# Patient Record
Sex: FEMALE | Race: BLACK OR AFRICAN AMERICAN | NOT HISPANIC OR LATINO | Employment: OTHER | URBAN - METROPOLITAN AREA
[De-identification: names, ages, dates, MRNs, and addresses within clinical notes are randomized per-mention and may not be internally consistent; named-entity substitution may affect disease eponyms.]

---

## 2018-02-11 ENCOUNTER — HOSPITAL ENCOUNTER (EMERGENCY)
Facility: HOSPITAL | Age: 58
Discharge: HOME | End: 2018-02-12
Attending: EMERGENCY MEDICINE
Payer: MEDICARE

## 2018-02-11 DIAGNOSIS — R06.2 WHEEZING: ICD-10-CM

## 2018-02-11 DIAGNOSIS — R68.89 FLU-LIKE SYMPTOMS: Primary | ICD-10-CM

## 2018-02-11 LAB
FLUAV AG SPEC QL IA: NEGATIVE
FLUBV AG SPEC QL IA: NEGATIVE
SPECIMEN SOURCE: NORMAL

## 2018-02-11 PROCEDURE — 87400 INFLUENZA A/B EACH AG IA: CPT | Mod: 59

## 2018-02-11 PROCEDURE — 99284 EMERGENCY DEPT VISIT MOD MDM: CPT

## 2018-02-12 VITALS
HEART RATE: 88 BPM | SYSTOLIC BLOOD PRESSURE: 133 MMHG | BODY MASS INDEX: 34.15 KG/M2 | RESPIRATION RATE: 18 BRPM | TEMPERATURE: 98 F | OXYGEN SATURATION: 97 % | WEIGHT: 200 LBS | HEIGHT: 64 IN | DIASTOLIC BLOOD PRESSURE: 86 MMHG

## 2018-02-12 PROCEDURE — 25000003 PHARM REV CODE 250: Performed by: NURSE PRACTITIONER

## 2018-02-12 RX ORDER — DOXYCYCLINE HYCLATE 100 MG
100 TABLET ORAL
Status: COMPLETED | OUTPATIENT
Start: 2018-02-12 | End: 2018-02-12

## 2018-02-12 RX ORDER — DOXYCYCLINE 100 MG/1
100 CAPSULE ORAL 2 TIMES DAILY
Qty: 14 CAPSULE | Refills: 0 | Status: SHIPPED | OUTPATIENT
Start: 2018-02-12 | End: 2018-02-19

## 2018-02-12 RX ORDER — OSELTAMIVIR PHOSPHATE 75 MG/1
75 CAPSULE ORAL 2 TIMES DAILY
Qty: 10 CAPSULE | Refills: 0 | Status: SHIPPED | OUTPATIENT
Start: 2018-02-12 | End: 2018-02-17

## 2018-02-12 RX ORDER — OSELTAMIVIR PHOSPHATE 75 MG/1
75 CAPSULE ORAL
Status: COMPLETED | OUTPATIENT
Start: 2018-02-12 | End: 2018-02-12

## 2018-02-12 RX ORDER — OSELTAMIVIR PHOSPHATE 75 MG/1
75 CAPSULE ORAL 2 TIMES DAILY
Qty: 10 CAPSULE | Refills: 0 | Status: SHIPPED | OUTPATIENT
Start: 2018-02-12 | End: 2018-02-12 | Stop reason: CLARIF

## 2018-02-12 RX ADMIN — DOXYCYCLINE HYCLATE 100 MG: 100 TABLET, COATED ORAL at 12:02

## 2018-02-12 RX ADMIN — OSELTAMIVIR PHOSPHATE 75 MG: 75 CAPSULE ORAL at 01:02

## 2018-02-12 NOTE — ED PROVIDER NOTES
Encounter Date: 2018       History     Chief Complaint   Patient presents with    Fever     chills, fever, sore throat, dizziness, cough, HA, body aches;      The history is provided by the patient and the spouse. No  was used.   URI   The primary symptoms include fever, fatigue, headaches, ear pain, sore throat, cough, wheezing, myalgias and arthralgias. Primary symptoms do not include abdominal pain, nausea, vomiting or rash. This is a new problem. The problem has not changed since onset.The maximum temperature recorded prior to her arrival was 101 to 101.9 F.   The myalgias are not associated with weakness.   Symptoms associated with the illness include chills, congestion and rhinorrhea. The illness is not associated with sinus pressure. The following treatments were addressed: A decongestant was ineffective (theraflu). Risk factors for severe complications from URI include chronic cardiac disease.     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%    Diastolic dysfunction with heart failure 2015    Echo     HTN (hypertension)     Hyperglycemia     Hyperlipidemia     ICD (implantable cardioverter-defibrillator), dual, in situ     [employer]     LAE (left atrial enlargement) 2015    MR (mitral regurgitation) 2015    mod     Past Surgical History:   Procedure Laterality Date    CARDIAC DEFIBRILLATOR PLACEMENT       SECTION       Family History   Problem Relation Age of Onset    Early death Mother     Hypertension Mother     Hypertension Father     Stroke Father     Depression Sister     Cancer Maternal Aunt     Cancer Paternal Aunt      Social History   Substance Use Topics    Smoking status:  Never Smoker    Smokeless tobacco: Not on file    Alcohol use No     Review of Systems   Constitutional: Positive for chills, fatigue and fever.   HENT: Positive for congestion, ear pain, rhinorrhea and sore throat. Negative for ear discharge, postnasal drip, sinus pressure, sneezing and voice change.    Eyes: Negative for discharge and itching.   Respiratory: Positive for cough and wheezing. Negative for shortness of breath.    Cardiovascular: Negative for chest pain, palpitations and leg swelling.   Gastrointestinal: Negative for abdominal pain, constipation, diarrhea, nausea and vomiting.   Endocrine: Negative for polydipsia, polyphagia and polyuria.   Genitourinary: Negative for dysuria, frequency, hematuria, urgency, vaginal bleeding, vaginal discharge and vaginal pain.   Musculoskeletal: Positive for arthralgias and myalgias.   Skin: Negative for rash and wound.   Neurological: Positive for headaches. Negative for dizziness, seizures, syncope, weakness and numbness.   Hematological: Negative for adenopathy. Does not bruise/bleed easily.   Psychiatric/Behavioral: Negative for self-injury and suicidal ideas. The patient is not nervous/anxious.        Physical Exam     Initial Vitals [02/11/18 2238]   BP Pulse Resp Temp SpO2   (!) 140/88 93 16 98.3 °F (36.8 °C) 99 %      MAP       105.33         Physical Exam    Nursing note and vitals reviewed.  Constitutional: She appears well-developed and well-nourished.   HENT:   Head: Normocephalic and atraumatic.   Right Ear: External ear normal.   Left Ear: External ear normal.   Nose: Nose normal. No epistaxis.   Mouth/Throat: Oropharynx is clear and moist.   Eyes: Conjunctivae and EOM are normal. Pupils are equal, round, and reactive to light. Right eye exhibits no discharge. Left eye exhibits no discharge.   Neck: Normal range of motion.   Cardiovascular: Regular rhythm, S1 normal, S2 normal and normal heart sounds. Exam reveals no gallop.    No murmur  heard.  Pulmonary/Chest: Effort normal and breath sounds normal. No respiratory distress. She has no decreased breath sounds. She has no wheezes. She has no rhonchi. She has no rales.   Abdominal: She exhibits no distension.   Musculoskeletal: Normal range of motion.   Neurological: She is alert and oriented to person, place, and time.   Skin: Skin is dry. Capillary refill takes less than 2 seconds.         ED Course   Procedures  Labs Reviewed   INFLUENZA A AND B ANTIGEN             Medical Decision Making:   This is an evaluation of a 57 y.o. female that presents to the Emergency Department for cough, rhinorrhea and nasal congestion. The patient is a non-toxic, afebrile, and well appearing female. On physical exam ears and pharynx are without evidence of infection. Appears well hydrated with moist mucus membranes. Neck soft and supple with no meningeal signs or cervical lymphadenopathy. Breath sounds are clear and equal bilaterally with no adventitious breath sounds, tachypnea or respiratory distress with room air pulse ox of 97% and no evidence of hypoxia.     Vital Signs Are Reassuring.  RESULTS: neg flu a/b, normal cxr    My overall impression is flu-like symptoms. I considered, but at this time, do not suspect OM, OE, strep pharyngitis, meningitis, pneumonia, or acute bacterial sinusitis. Pt does have altered lung sounds (per her report), decreased oxygenation (93%), cough and fever, so I will cover for pneumonia with doxycyline upon discharge (and start here in ED).  Due to her hx of chf, I will also treat with tamiflu per cdc guidelines. Pt is urged to f/u with her pcp this week for recheck.    All questions or concerns have been addressed.     This case was discussed with Dr. Lund who is in agreement with my assessment and plan.                      ED Course as of Feb 12 0108   Sun Feb 11, 2018 2338 Influenza A Ag, EIA: Negative [VC]   2338 Influenza B Ag, EIA: Negative [VC]   Mon Feb 12, 2018   0048    No acute process. X-Ray Chest PA And Lateral [VC]      ED Course User Index  [VC] Bill Carbajal DNP     Clinical Impression:   The primary encounter diagnosis was Flu-like symptoms. A diagnosis of Wheezing was also pertinent to this visit.    Disposition:   Disposition: Discharged  Condition: Stable                        Bill Carbajal DNP  02/12/18 0113

## 2018-02-12 NOTE — DISCHARGE INSTRUCTIONS
Alternate Tylenol and advil every 3 hours for fever/body aches. Flonase for congestion and runny nose. Cepacol lozenges, warm fluids to drink, and warm salt water gargles for sore throat. Delsym over the counter for cough. Return to the Emergency department for any worsening or failure to improve, otherwise follow up with your primary care provider.

## 2018-02-12 NOTE — ED TRIAGE NOTES
Pt states that she started with body aches, fever, chills, sore throat, cough (productive with thick dark beige), wheezing. Pt ha tried theraflu OTC but has had no relief.

## 2018-07-26 ENCOUNTER — HOSPITAL ENCOUNTER (EMERGENCY)
Facility: HOSPITAL | Age: 58
Discharge: HOME | End: 2018-07-26
Attending: EMERGENCY MEDICINE
Payer: MEDICARE

## 2018-07-26 VITALS
OXYGEN SATURATION: 98 % | BODY MASS INDEX: 36.37 KG/M2 | HEART RATE: 82 BPM | HEIGHT: 64 IN | SYSTOLIC BLOOD PRESSURE: 132 MMHG | WEIGHT: 213 LBS | RESPIRATION RATE: 19 BRPM | DIASTOLIC BLOOD PRESSURE: 78 MMHG | TEMPERATURE: 99 F

## 2018-07-26 DIAGNOSIS — S06.0X0A CONCUSSION WITHOUT LOSS OF CONSCIOUSNESS, INITIAL ENCOUNTER: Primary | ICD-10-CM

## 2018-07-26 LAB — POCT GLUCOSE: 119 MG/DL (ref 70–110)

## 2018-07-26 PROCEDURE — 99284 EMERGENCY DEPT VISIT MOD MDM: CPT | Mod: 25

## 2018-07-26 PROCEDURE — 82962 GLUCOSE BLOOD TEST: CPT

## 2018-07-26 RX ORDER — ATORVASTATIN CALCIUM 20 MG/1
20 TABLET, FILM COATED ORAL DAILY
COMMUNITY

## 2018-07-26 NOTE — ED PROVIDER NOTES
Encounter Date: 2018       History     Chief Complaint   Patient presents with    Fall     Fell backwards and hit head while bowling.  C/o headache, blurry vision, and nausea.  Denies LOC after fall.     57-year-old female with past medical history anxiety, CHF, hypertension, hyperglycemia, hyperlipidemia, MR, left atrial enlargement, presents to ED complaining of occipital headache, nausea without emesis, intermittent blurred vision, all since fall on Monday evening.  Patient was bowling when she slipped on the oil of the kade, onto her buttock and struck the occipital scalp.  No loss of consciousness at the time of injury. Ambulating immediately afterwards.  Denies history of head trauma or surgeries.  Denies neck pain.  Denies abdominal pain.  Denies unilateral weakness. Denies slurred speech. No change in mentation per family member.  Denies scalp wound.  Denies worse headache of her life, however does state that it is a dull ache throughout the day.  No tinnitus.  She does admit to lightheadedness when rising from a seated position.  No syncope or near syncope.  + photophobia. + phonophobia. No SOB or CP.           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%    Diastolic dysfunction with heart failure 2015    Echo     HTN (hypertension)     Hyperglycemia     Hyperlipidemia     ICD (implantable cardioverter-defibrillator), dual, in situ     [employer]     LAE (left atrial enlargement) 2015    MR (mitral regurgitation) 2015    mod     Past Surgical History:   Procedure Laterality Date    CARDIAC DEFIBRILLATOR PLACEMENT       SECTION       Family History   Problem Relation Age of Onset    Early death Mother      Hypertension Mother     Hypertension Father     Stroke Father     Depression Sister     Cancer Maternal Aunt     Cancer Paternal Aunt      Social History   Substance Use Topics    Smoking status: Never Smoker    Smokeless tobacco: Not on file    Alcohol use No     Review of Systems   Constitutional: Negative for fever.   HENT: Negative for dental problem, ear discharge, ear pain, facial swelling and sore throat.    Eyes: Positive for photophobia and visual disturbance. Negative for pain, discharge, redness and itching.   Respiratory: Negative for chest tightness, shortness of breath and wheezing.    Cardiovascular: Negative for chest pain.   Gastrointestinal: Positive for nausea. Negative for abdominal pain and vomiting.   Endocrine: Negative.    Genitourinary: Negative for dysuria.   Musculoskeletal: Negative for back pain, neck pain and neck stiffness.   Skin: Negative for rash.   Neurological: Positive for light-headedness and headaches. Negative for dizziness, seizures, syncope, weakness and numbness.   Hematological: Does not bruise/bleed easily.   Psychiatric/Behavioral: Negative.    All other systems reviewed and are negative.      Physical Exam     Initial Vitals [07/26/18 1258]   BP Pulse Resp Temp SpO2   (!) 146/93 97 18 98.1 °F (36.7 °C) 99 %      MAP       --         Physical Exam    Nursing note and vitals reviewed.  Constitutional: She appears well-developed and well-nourished. She is not diaphoretic. No distress.   HENT:   Head: Normocephalic and atraumatic.       Eyes: Conjunctivae and EOM are normal. Pupils are equal, round, and reactive to light.   Neck: Normal range of motion. Neck supple. No tracheal deviation present.   Cardiovascular: Intact distal pulses.   Pulmonary/Chest: Breath sounds normal. No stridor. No respiratory distress. She has no wheezes.   Abdominal: Soft. Bowel sounds are normal. She exhibits no distension. There is no tenderness.   No pulsatile mass   Musculoskeletal:  Normal range of motion. She exhibits no tenderness.   No midline spinal ttp.   Lymphadenopathy:     She has no cervical adenopathy.   Neurological: She is alert and oriented to person, place, and time.   No focal deficit. Finger to nose intact bilaterally. Heel to toe intact bilaterally.   Skin: Skin is warm and dry. Capillary refill takes less than 2 seconds.   Psychiatric: She has a normal mood and affect. Her behavior is normal. Judgment and thought content normal.         ED Course   Procedures  Labs Reviewed   POCT GLUCOSE - Abnormal; Notable for the following:        Result Value    POCT Glucose 119 (*)     All other components within normal limits   POCT GLUCOSE MONITORING CONTINUOUS          Imaging Results          CT Head Without Contrast (Final result)  Result time 07/26/18 13:46:57    Final result by Cristiana Martinez MD (07/26/18 13:46:57)                 Impression:      No acute abnormality.      Electronically signed by: Cristiana Martinez MD  Date:    07/26/2018  Time:    13:46             Narrative:    EXAMINATION:  CT HEAD WITHOUT CONTRAST    CLINICAL HISTORY:  Fall; Head Injury;    TECHNIQUE:  Low dose axial CT images obtained throughout the head without intravenous contrast. Sagittal and coronal reconstructions were performed.    COMPARISON:  None.    FINDINGS:  Intracranial compartment:    Ventricles and sulci are normal in size for age without evidence of hydrocephalus. No extra-axial blood or fluid collections.    The brain parenchyma appears normal. No parenchymal mass, hemorrhage, edema or major vascular distribution infarct.    Skull/extracranial contents (limited evaluation): No fracture. Mastoid air cells and paranasal sinuses are essentially clear.                                 Medical Decision Making:   Differential Diagnosis:   Cerebral hemorrhage, subdural hematoma, concussion, posttraumatic headache  ED Management:  56yo F with recent parieto-occipital head trauma 3 days ago.  AAO x4. Ambulatory at scene without loss of consciousness.  Speaking in full sentences, answering questions appropriately.  Ambulating about the ED without issue.  No obvious focal deficit.  The given trauma and complaints, I do think this may represent a concussion. Glucose wnl. Visual acuity wnl. Not orthostatic. I've discussed with patient what to expect from concussions, and I've asked her to f/u with her PCP next week for reevaluation. I've given pt instructions to have a low threshold for returning to ED if any worsening or change in symptoms. She does understand and agree.   Other:   I have discussed this case with another health care provider.       <> Summary of the Discussion: Dr. Mitchell              Attending Attestation:     Physician Attestation Statement for NP/PA:   I discussed this assessment and plan of this patient with the NP/PA, but I did not personally examine the patient. The face to face encounter was performed by the NP/PA.    Other NP/PA Attestation Additions:    History of Present Illness: Mechanical fall, negative work up for traumatic injury.                        Clinical Impression:   The encounter diagnosis was Concussion without loss of consciousness, initial encounter.      Disposition:   Disposition: Discharged  Condition: Stable                        Neville Burnham PA-C  07/26/18 0701       Pedro Mitchell MD  07/26/18 1541

## 2018-07-26 NOTE — ED TRIAGE NOTES
"Patient reports falling while bowling on Monday night. Patient reports intermittent nausea and head pain since falling. Patient state, "My vision is a little blurry".  Patient fell on her back denies LOC patient reports a history of back pain so her back is not giving her any problems.  Patient also reports that after falling she was " a little disoriented".  "

## 2018-07-26 NOTE — DISCHARGE INSTRUCTIONS
Follow-up with your primary care physician next week for re-evaluation and further recommendations.  Get some good rest.  Return to the ED if you experience worsening of symptoms, if any other problems occur during

## 2018-09-02 ENCOUNTER — NURSE TRIAGE (OUTPATIENT)
Dept: ADMINISTRATIVE | Facility: CLINIC | Age: 58
End: 2018-09-02

## 2018-09-02 NOTE — TELEPHONE ENCOUNTER
Patient called to report the following:     -abrasion in vaginal area  -my  was not fully erected and tore my skin while having intercourse  -denies sexual assault   -very painful abrasion all day x 4 days     Education completed per [facility] On Call Care Advice including follow up with ER. Patient verbalized understating.     Reason for Disposition   Followed a genital area injury   [1] MODERATE-SEVERE pain AND [2] lasts > 1 hour    Protocols used:  VULVAR SYMPTOMS-A-,  GENITAL INJURY - FEMALE-A-AH

## 2019-11-05 ENCOUNTER — TELEPHONE (OUTPATIENT)
Dept: EMERGENCY MEDICINE | Facility: HOSPITAL | Age: 59
End: 2019-11-05

## 2019-11-05 NOTE — TELEPHONE ENCOUNTER
Follow up call placed to check on patient due to leaving without being seen, no answer, left message to return call.

## 2020-08-06 ENCOUNTER — PES CALL (OUTPATIENT)
Dept: ADMINISTRATIVE | Facility: CLINIC | Age: 60
End: 2020-08-06

## 2020-11-06 ENCOUNTER — PES CALL (OUTPATIENT)
Dept: ADMINISTRATIVE | Facility: CLINIC | Age: 60
End: 2020-11-06

## 2021-01-01 ENCOUNTER — OFFICE VISIT (OUTPATIENT)
Dept: PSYCHIATRY | Facility: CLINIC | Age: 61
End: 2021-01-01
Payer: MEDICARE

## 2021-01-01 VITALS
SYSTOLIC BLOOD PRESSURE: 97 MMHG | DIASTOLIC BLOOD PRESSURE: 59 MMHG | BODY MASS INDEX: 35.42 KG/M2 | HEART RATE: 78 BPM | WEIGHT: 206.38 LBS

## 2021-01-01 VITALS
HEIGHT: 64 IN | SYSTOLIC BLOOD PRESSURE: 115 MMHG | DIASTOLIC BLOOD PRESSURE: 71 MMHG | WEIGHT: 213.19 LBS | BODY MASS INDEX: 36.4 KG/M2 | HEART RATE: 80 BPM

## 2021-01-01 DIAGNOSIS — F33.1 MODERATE EPISODE OF RECURRENT MAJOR DEPRESSIVE DISORDER: ICD-10-CM

## 2021-01-01 DIAGNOSIS — F41.1 GENERALIZED ANXIETY DISORDER: ICD-10-CM

## 2021-01-01 PROCEDURE — 99204 OFFICE O/P NEW MOD 45 MIN: CPT | Mod: GC,S$GLB,, | Performed by: STUDENT IN AN ORGANIZED HEALTH CARE EDUCATION/TRAINING PROGRAM

## 2021-01-01 PROCEDURE — 99999 PR PBB SHADOW E&M-EST. PATIENT-LVL II: CPT | Mod: PBBFAC,GC,, | Performed by: STUDENT IN AN ORGANIZED HEALTH CARE EDUCATION/TRAINING PROGRAM

## 2021-01-01 PROCEDURE — 99214 PR OFFICE/OUTPT VISIT, EST, LEVL IV, 30-39 MIN: ICD-10-PCS | Mod: GC,S$GLB,, | Performed by: STUDENT IN AN ORGANIZED HEALTH CARE EDUCATION/TRAINING PROGRAM

## 2021-01-01 PROCEDURE — 99214 OFFICE O/P EST MOD 30 MIN: CPT | Mod: GC,S$GLB,, | Performed by: STUDENT IN AN ORGANIZED HEALTH CARE EDUCATION/TRAINING PROGRAM

## 2021-01-01 PROCEDURE — 99999 PR PBB SHADOW E&M-EST. PATIENT-LVL II: ICD-10-PCS | Mod: PBBFAC,GC,, | Performed by: STUDENT IN AN ORGANIZED HEALTH CARE EDUCATION/TRAINING PROGRAM

## 2021-01-01 PROCEDURE — 99999 PR PBB SHADOW E&M-EST. PATIENT-LVL III: ICD-10-PCS | Mod: PBBFAC,GC,, | Performed by: STUDENT IN AN ORGANIZED HEALTH CARE EDUCATION/TRAINING PROGRAM

## 2021-01-01 PROCEDURE — 99204 PR OFFICE/OUTPT VISIT, NEW, LEVL IV, 45-59 MIN: ICD-10-PCS | Mod: GC,S$GLB,, | Performed by: STUDENT IN AN ORGANIZED HEALTH CARE EDUCATION/TRAINING PROGRAM

## 2021-01-01 PROCEDURE — 99999 PR PBB SHADOW E&M-EST. PATIENT-LVL III: CPT | Mod: PBBFAC,GC,, | Performed by: STUDENT IN AN ORGANIZED HEALTH CARE EDUCATION/TRAINING PROGRAM

## 2021-01-01 RX ORDER — AMITRIPTYLINE HYDROCHLORIDE 100 MG/1
100 TABLET ORAL NIGHTLY
Qty: 30 TABLET | Refills: 11 | Status: SHIPPED | OUTPATIENT
Start: 2021-01-01 | End: 2021-01-01

## 2021-01-01 RX ORDER — ALPRAZOLAM 1 MG/1
1 TABLET ORAL DAILY PRN
Qty: 30 TABLET | Refills: 1 | Status: SHIPPED | OUTPATIENT
Start: 2021-01-01 | End: 2021-01-01 | Stop reason: SDUPTHER

## 2021-01-01 RX ORDER — ALPRAZOLAM 1 MG/1
1 TABLET ORAL DAILY PRN
Qty: 30 TABLET | Refills: 1 | Status: SHIPPED | OUTPATIENT
Start: 2021-01-01 | End: 2022-01-01 | Stop reason: SDUPTHER

## 2021-01-01 RX ORDER — AMITRIPTYLINE HYDROCHLORIDE 100 MG/1
200 TABLET ORAL NIGHTLY
Qty: 30 TABLET | Refills: 2 | Status: SHIPPED | OUTPATIENT
Start: 2021-01-01 | End: 2022-01-01

## 2021-01-01 RX ORDER — AMITRIPTYLINE HYDROCHLORIDE 150 MG/1
150 TABLET ORAL NIGHTLY
Qty: 30 TABLET | Refills: 2 | Status: SHIPPED | OUTPATIENT
Start: 2021-01-01 | End: 2021-01-01

## 2021-01-29 ENCOUNTER — HOSPITAL ENCOUNTER (EMERGENCY)
Facility: HOSPITAL | Age: 61
Discharge: HOME | End: 2021-01-29
Attending: EMERGENCY MEDICINE
Payer: MEDICARE

## 2021-01-29 VITALS
TEMPERATURE: 98 F | DIASTOLIC BLOOD PRESSURE: 95 MMHG | HEART RATE: 86 BPM | HEIGHT: 64 IN | RESPIRATION RATE: 18 BRPM | OXYGEN SATURATION: 97 % | WEIGHT: 214 LBS | BODY MASS INDEX: 36.54 KG/M2 | SYSTOLIC BLOOD PRESSURE: 137 MMHG

## 2021-01-29 DIAGNOSIS — S09.90XA CLOSED HEAD INJURY, INITIAL ENCOUNTER: Primary | ICD-10-CM

## 2021-01-29 DIAGNOSIS — S00.93XA CONTUSION OF HEAD, UNSPECIFIED PART OF HEAD, INITIAL ENCOUNTER: ICD-10-CM

## 2021-01-29 DIAGNOSIS — S09.90XA INJURY OF HEAD, INITIAL ENCOUNTER: ICD-10-CM

## 2021-01-29 PROCEDURE — 25000003 PHARM REV CODE 250: Performed by: PHYSICIAN ASSISTANT

## 2021-01-29 PROCEDURE — 99284 EMERGENCY DEPT VISIT MOD MDM: CPT | Mod: 25

## 2021-01-29 RX ORDER — BUTALBITAL, ACETAMINOPHEN AND CAFFEINE 50; 325; 40 MG/1; MG/1; MG/1
1 TABLET ORAL EVERY 6 HOURS PRN
Qty: 11 TABLET | Refills: 0 | Status: SHIPPED | OUTPATIENT
Start: 2021-01-29 | End: 2021-02-28

## 2021-01-29 RX ORDER — BUTALBITAL, ACETAMINOPHEN AND CAFFEINE 50; 325; 40 MG/1; MG/1; MG/1
1 TABLET ORAL
Status: COMPLETED | OUTPATIENT
Start: 2021-01-29 | End: 2021-01-29

## 2021-01-29 RX ORDER — ACETAMINOPHEN 325 MG/1
650 TABLET ORAL
Status: COMPLETED | OUTPATIENT
Start: 2021-01-29 | End: 2021-01-29

## 2021-01-29 RX ORDER — ONDANSETRON 4 MG/1
4 TABLET, ORALLY DISINTEGRATING ORAL
Status: COMPLETED | OUTPATIENT
Start: 2021-01-29 | End: 2021-01-29

## 2021-01-29 RX ORDER — ONDANSETRON 4 MG/1
4 TABLET, FILM COATED ORAL EVERY 6 HOURS PRN
Qty: 12 TABLET | Refills: 0 | Status: SHIPPED | OUTPATIENT
Start: 2021-01-29

## 2021-01-29 RX ORDER — KETOROLAC TROMETHAMINE 10 MG/1
10 TABLET, FILM COATED ORAL
Status: DISCONTINUED | OUTPATIENT
Start: 2021-01-29 | End: 2021-01-29

## 2021-01-29 RX ADMIN — ACETAMINOPHEN 650 MG: 325 TABLET ORAL at 10:01

## 2021-01-29 RX ADMIN — BUTALBITAL, ACETAMINOPHEN AND CAFFEINE 1 TABLET: 50; 325; 40 TABLET ORAL at 11:01

## 2021-01-29 RX ADMIN — ONDANSETRON 4 MG: 4 TABLET, ORALLY DISINTEGRATING ORAL at 11:01

## 2021-04-16 ENCOUNTER — PATIENT MESSAGE (OUTPATIENT)
Dept: RESEARCH | Facility: HOSPITAL | Age: 61
End: 2021-04-16

## 2021-09-17 PROBLEM — F32.9 MAJOR DEPRESSIVE DISORDER: Status: ACTIVE | Noted: 2021-01-01

## 2021-09-17 PROBLEM — F41.1 GENERALIZED ANXIETY DISORDER: Status: ACTIVE | Noted: 2021-01-01

## 2021-09-17 PROBLEM — F41.9 ANXIETY AND DEPRESSION: Status: ACTIVE | Noted: 2021-01-01

## 2021-09-17 PROBLEM — F32.A ANXIETY AND DEPRESSION: Status: ACTIVE | Noted: 2021-01-01

## 2022-01-01 ENCOUNTER — HOSPITAL ENCOUNTER (INPATIENT)
Facility: HOSPITAL | Age: 62
LOS: 1 days | DRG: 308 | End: 2022-09-02
Attending: EMERGENCY MEDICINE | Admitting: HOSPITALIST
Payer: MEDICARE

## 2022-01-01 ENCOUNTER — TELEPHONE (OUTPATIENT)
Dept: PSYCHIATRY | Facility: CLINIC | Age: 62
End: 2022-01-01
Payer: MEDICARE

## 2022-01-01 ENCOUNTER — OFFICE VISIT (OUTPATIENT)
Dept: PSYCHIATRY | Facility: CLINIC | Age: 62
End: 2022-01-01
Payer: MEDICARE

## 2022-01-01 VITALS
SYSTOLIC BLOOD PRESSURE: 102 MMHG | HEART RATE: 85 BPM | BODY MASS INDEX: 33.34 KG/M2 | WEIGHT: 194.25 LBS | DIASTOLIC BLOOD PRESSURE: 65 MMHG

## 2022-01-01 VITALS
WEIGHT: 204.69 LBS | BODY MASS INDEX: 35.14 KG/M2 | HEART RATE: 97 BPM | DIASTOLIC BLOOD PRESSURE: 76 MMHG | SYSTOLIC BLOOD PRESSURE: 120 MMHG

## 2022-01-01 VITALS
SYSTOLIC BLOOD PRESSURE: 141 MMHG | HEART RATE: 50 BPM | HEIGHT: 64 IN | OXYGEN SATURATION: 74 % | WEIGHT: 195 LBS | BODY MASS INDEX: 33.29 KG/M2 | DIASTOLIC BLOOD PRESSURE: 105 MMHG | RESPIRATION RATE: 38 BRPM | TEMPERATURE: 95 F

## 2022-01-01 VITALS
HEART RATE: 102 BPM | BODY MASS INDEX: 33.74 KG/M2 | WEIGHT: 196.56 LBS | SYSTOLIC BLOOD PRESSURE: 114 MMHG | DIASTOLIC BLOOD PRESSURE: 64 MMHG

## 2022-01-01 DIAGNOSIS — Z45.2 PICC (PERIPHERALLY INSERTED CENTRAL CATHETER) IN PLACE: ICD-10-CM

## 2022-01-01 DIAGNOSIS — F41.0 PANIC DISORDER: ICD-10-CM

## 2022-01-01 DIAGNOSIS — Z51.5 PALLIATIVE CARE ENCOUNTER: Primary | ICD-10-CM

## 2022-01-01 DIAGNOSIS — F33.1 MODERATE EPISODE OF RECURRENT MAJOR DEPRESSIVE DISORDER: ICD-10-CM

## 2022-01-01 DIAGNOSIS — Z97.8 ENDOTRACHEAL TUBE PRESENT: ICD-10-CM

## 2022-01-01 DIAGNOSIS — Z46.59 ENCOUNTER FOR OROGASTRIC (OG) TUBE PLACEMENT: ICD-10-CM

## 2022-01-01 DIAGNOSIS — Z71.89 GOALS OF CARE, COUNSELING/DISCUSSION: ICD-10-CM

## 2022-01-01 DIAGNOSIS — I46.9 CARDIAC ARREST: ICD-10-CM

## 2022-01-01 DIAGNOSIS — F33.1 MODERATE EPISODE OF RECURRENT MAJOR DEPRESSIVE DISORDER: Primary | ICD-10-CM

## 2022-01-01 DIAGNOSIS — F41.1 GENERALIZED ANXIETY DISORDER: ICD-10-CM

## 2022-01-01 DIAGNOSIS — I50.43 ACUTE ON CHRONIC COMBINED SYSTOLIC AND DIASTOLIC CONGESTIVE HEART FAILURE: ICD-10-CM

## 2022-01-01 LAB
ALBUMIN SERPL BCP-MCNC: 2.9 G/DL (ref 3.5–5.2)
ALLENS TEST: ABNORMAL
ALP SERPL-CCNC: 128 U/L (ref 55–135)
ALT SERPL W/O P-5'-P-CCNC: 510 U/L (ref 10–44)
AMPHET+METHAMPHET UR QL: NEGATIVE
ANION GAP SERPL CALC-SCNC: 13 MMOL/L (ref 8–16)
AST SERPL-CCNC: 408 U/L (ref 10–40)
AV INDEX (PROSTH): 0.57
AV MEAN GRADIENT: 3 MMHG
AV PEAK GRADIENT: 6 MMHG
AV VALVE AREA: 1.42 CM2
AV VELOCITY RATIO: 0.58
BACTERIA #/AREA URNS HPF: NORMAL /HPF
BARBITURATES UR QL SCN>200 NG/ML: NEGATIVE
BASOPHILS # BLD AUTO: ABNORMAL K/UL (ref 0–0.2)
BASOPHILS NFR BLD: 0 % (ref 0–1.9)
BENZODIAZ UR QL SCN>200 NG/ML: ABNORMAL
BILIRUB SERPL-MCNC: 0.3 MG/DL (ref 0.1–1)
BILIRUB UR QL STRIP: NEGATIVE
BNP SERPL-MCNC: 50 PG/ML (ref 0–99)
BSA FOR ECHO PROCEDURE: 1.99 M2
BUN SERPL-MCNC: 15 MG/DL (ref 8–23)
BZE UR QL SCN: NEGATIVE
CALCIUM SERPL-MCNC: 8.1 MG/DL (ref 8.7–10.5)
CANNABINOIDS UR QL SCN: NEGATIVE
CHLORIDE SERPL-SCNC: 107 MMOL/L (ref 95–110)
CLARITY UR: CLEAR
CO2 SERPL-SCNC: 19 MMOL/L (ref 23–29)
COLOR UR: YELLOW
CREAT SERPL-MCNC: 1 MG/DL (ref 0.5–1.4)
CREAT UR-MCNC: 59.1 MG/DL (ref 15–325)
CTP QC/QA: YES
CV ECHO LV RWT: 0.34 CM
DELSYS: ABNORMAL
DIFFERENTIAL METHOD: ABNORMAL
DOP CALC AO PEAK VEL: 1.2 M/S
DOP CALC AO VTI: 19.36 CM
DOP CALC LVOT AREA: 2.5 CM2
DOP CALC LVOT DIAMETER: 1.78 CM
DOP CALC LVOT PEAK VEL: 0.69 M/S
DOP CALC LVOT STROKE VOLUME: 27.53 CM3
DOP CALCLVOT PEAK VEL VTI: 11.07 CM
ECHO LV POSTERIOR WALL: 1.01 CM (ref 0.6–1.1)
EJECTION FRACTION: 25 %
EOSINOPHIL # BLD AUTO: ABNORMAL K/UL (ref 0–0.5)
EOSINOPHIL NFR BLD: 0 % (ref 0–8)
ERYTHROCYTE [DISTWIDTH] IN BLOOD BY AUTOMATED COUNT: 14.1 % (ref 11.5–14.5)
ERYTHROCYTE [SEDIMENTATION RATE] IN BLOOD BY WESTERGREN METHOD: 22 MM/H
EST. GFR  (NO RACE VARIABLE): >60 ML/MIN/1.73 M^2
FIO2: 100
FRACTIONAL SHORTENING: 5 % (ref 28–44)
GLUCOSE SERPL-MCNC: 243 MG/DL (ref 70–110)
GLUCOSE SERPL-MCNC: 780 MG/DL (ref 70–110)
GLUCOSE UR QL STRIP: ABNORMAL
HCO3 UR-SCNC: 17.3 MMOL/L (ref 24–28)
HCO3 UR-SCNC: 18.3 MMOL/L (ref 24–28)
HCO3 UR-SCNC: 19.8 MMOL/L (ref 24–28)
HCO3 UR-SCNC: 23.8 MMOL/L (ref 24–28)
HCO3 UR-SCNC: 6.4 MMOL/L (ref 24–28)
HCT VFR BLD AUTO: 38 % (ref 37–48.5)
HGB BLD-MCNC: 12.3 G/DL (ref 12–16)
HGB UR QL STRIP: NEGATIVE
HYALINE CASTS #/AREA URNS LPF: 1 /LPF
IMM GRANULOCYTES # BLD AUTO: ABNORMAL K/UL (ref 0–0.04)
IMM GRANULOCYTES NFR BLD AUTO: ABNORMAL % (ref 0–0.5)
INR PPP: 1.7 (ref 0.8–1.2)
INTERVENTRICULAR SEPTUM: 1.21 CM (ref 0.6–1.1)
KETONES UR QL STRIP: NEGATIVE
LA MAJOR: 5.35 CM
LA MINOR: 5.85 CM
LA WIDTH: 5.21 CM
LACTATE SERPL-SCNC: 2.7 MMOL/L (ref 0.5–2.2)
LACTATE SERPL-SCNC: 3.3 MMOL/L (ref 0.5–2.2)
LDH SERPL L TO P-CCNC: 6.52 MMOL/L (ref 0.5–2.2)
LEFT ATRIUM SIZE: 4.96 CM
LEFT ATRIUM VOLUME INDEX: 63.3 ML/M2
LEFT ATRIUM VOLUME: 122.76 CM3
LEFT INTERNAL DIMENSION IN SYSTOLE: 5.73 CM (ref 2.1–4)
LEFT VENTRICLE DIASTOLIC VOLUME INDEX: 93.07 ML/M2
LEFT VENTRICLE DIASTOLIC VOLUME: 180.56 ML
LEFT VENTRICLE MASS INDEX: 146 G/M2
LEFT VENTRICLE SYSTOLIC VOLUME INDEX: 83.5 ML/M2
LEFT VENTRICLE SYSTOLIC VOLUME: 161.96 ML
LEFT VENTRICULAR INTERNAL DIMENSION IN DIASTOLE: 6.01 CM (ref 3.5–6)
LEFT VENTRICULAR MASS: 283.78 G
LEUKOCYTE ESTERASE UR QL STRIP: NEGATIVE
LYMPHOCYTES # BLD AUTO: ABNORMAL K/UL (ref 1–4.8)
LYMPHOCYTES NFR BLD: 43 % (ref 18–48)
MAGNESIUM SERPL-MCNC: 2 MG/DL (ref 1.6–2.6)
MCH RBC QN AUTO: 30.5 PG (ref 27–31)
MCHC RBC AUTO-ENTMCNC: 32.4 G/DL (ref 32–36)
MCV RBC AUTO: 94 FL (ref 82–98)
METAMYELOCYTES NFR BLD MANUAL: 4 %
METHADONE UR QL SCN>300 NG/ML: NEGATIVE
MICROSCOPIC COMMENT: NORMAL
MIN VOL: 6.7
MIN VOL: 6.7
MIN VOL: 9.6
MIN VOL: 9.6
MODE: ABNORMAL
MONOCYTES # BLD AUTO: ABNORMAL K/UL (ref 0.3–1)
MONOCYTES NFR BLD: 1 % (ref 4–15)
MYELOCYTES NFR BLD MANUAL: 1 %
NEUTROPHILS NFR BLD: 43 % (ref 38–73)
NEUTS BAND NFR BLD MANUAL: 8 %
NITRITE UR QL STRIP: NEGATIVE
NRBC BLD-RTO: 0 /100 WBC
OPIATES UR QL SCN: ABNORMAL
PCO2 BLDA: 14.7 MMHG (ref 35–45)
PCO2 BLDA: 33.1 MMHG (ref 35–45)
PCO2 BLDA: 35.5 MMHG (ref 35–45)
PCO2 BLDA: 37.1 MMHG (ref 35–45)
PCO2 BLDA: 62.9 MMHG (ref 35–45)
PCP UR QL SCN>25 NG/ML: NEGATIVE
PEEP: 10
PEEP: 10
PEEP: 14
PEEP: 5
PH SMN: 7.19 [PH] (ref 7.35–7.45)
PH SMN: 7.25 [PH] (ref 7.35–7.45)
PH SMN: 7.32 [PH] (ref 7.35–7.45)
PH SMN: 7.33 [PH] (ref 7.35–7.45)
PH SMN: 7.33 [PH] (ref 7.35–7.45)
PH UR STRIP: 5 [PH] (ref 5–8)
PHOSPHATE SERPL-MCNC: 5.9 MG/DL (ref 2.7–4.5)
PIP: 21
PIP: 25
PISA MRMAX VEL: 0.05 M/S
PISA TR MAX VEL: 2.43 M/S
PLATELET # BLD AUTO: ABNORMAL K/UL (ref 150–450)
PLATELET BLD QL SMEAR: ABNORMAL
PMV BLD AUTO: ABNORMAL FL (ref 9.2–12.9)
PO2 BLDA: 33 MMHG (ref 40–60)
PO2 BLDA: 34 MMHG (ref 40–60)
PO2 BLDA: 37 MMHG (ref 80–100)
PO2 BLDA: 42 MMHG (ref 80–100)
PO2 BLDA: 57 MMHG (ref 80–100)
POC BE: -19 MMOL/L
POC BE: -5 MMOL/L
POC BE: -6 MMOL/L
POC BE: -7 MMOL/L
POC BE: -8 MMOL/L
POC SATURATED O2: 50 % (ref 95–100)
POC SATURATED O2: 56 % (ref 95–100)
POC SATURATED O2: 68 % (ref 95–100)
POC SATURATED O2: 74 % (ref 95–100)
POC SATURATED O2: 88 % (ref 95–100)
POC TCO2: 18 MMOL/L (ref 23–27)
POC TCO2: 19 MMOL/L (ref 23–27)
POC TCO2: 21 MMOL/L (ref 23–27)
POC TCO2: 26 MMOL/L (ref 24–29)
POC TCO2: 7 MMOL/L (ref 24–29)
POCT GLUCOSE: 496 MG/DL (ref 70–110)
POCT GLUCOSE: >500 MG/DL (ref 70–110)
POTASSIUM SERPL-SCNC: 4.2 MMOL/L (ref 3.5–5.1)
PROCALCITONIN SERPL IA-MCNC: 0.05 NG/ML
PROT SERPL-MCNC: 5.9 G/DL (ref 6–8.4)
PROT UR QL STRIP: NEGATIVE
PROTHROMBIN TIME: 17.6 SEC (ref 9–12.5)
RA MAJOR: 3.86 CM
RA WIDTH: 2.89 CM
RBC # BLD AUTO: 4.03 M/UL (ref 4–5.4)
SAMPLE: ABNORMAL
SARS-COV-2 RDRP RESP QL NAA+PROBE: NEGATIVE
SITE: ABNORMAL
SODIUM SERPL-SCNC: 139 MMOL/L (ref 136–145)
SP GR UR STRIP: 1.02 (ref 1–1.03)
SP02: 97
SP02: 97
SQUAMOUS #/AREA URNS HPF: 3 /HPF
STJ: 2.19 CM
TOXICOLOGY INFORMATION: ABNORMAL
TR MAX PG: 24 MMHG
TROPONIN I SERPL DL<=0.01 NG/ML-MCNC: 0.13 NG/ML (ref 0–0.03)
TROPONIN I SERPL DL<=0.01 NG/ML-MCNC: 3.49 NG/ML (ref 0–0.03)
URN SPEC COLLECT METH UR: ABNORMAL
UROBILINOGEN UR STRIP-ACNC: NEGATIVE EU/DL
VT: 380
VT: 40
VT: 450
VT: 450
WBC # BLD AUTO: 13.15 K/UL (ref 3.9–12.7)
YEAST URNS QL MICRO: NORMAL

## 2022-01-01 PROCEDURE — 25000003 PHARM REV CODE 250: Performed by: EMERGENCY MEDICINE

## 2022-01-01 PROCEDURE — 84100 ASSAY OF PHOSPHORUS: CPT | Performed by: EMERGENCY MEDICINE

## 2022-01-01 PROCEDURE — 96374 THER/PROPH/DIAG INJ IV PUSH: CPT | Mod: 59

## 2022-01-01 PROCEDURE — 99900035 HC TECH TIME PER 15 MIN (STAT)

## 2022-01-01 PROCEDURE — 99291 CRITICAL CARE FIRST HOUR: CPT | Mod: 25,,, | Performed by: INTERNAL MEDICINE

## 2022-01-01 PROCEDURE — 93010 EKG 12-LEAD: ICD-10-PCS | Mod: ,,, | Performed by: INTERNAL MEDICINE

## 2022-01-01 PROCEDURE — 63600175 PHARM REV CODE 636 W HCPCS: Performed by: STUDENT IN AN ORGANIZED HEALTH CARE EDUCATION/TRAINING PROGRAM

## 2022-01-01 PROCEDURE — 99223 1ST HOSP IP/OBS HIGH 75: CPT | Mod: ,,, | Performed by: NURSE PRACTITIONER

## 2022-01-01 PROCEDURE — 99214 OFFICE O/P EST MOD 30 MIN: CPT | Mod: S$GLB,,, | Performed by: NURSE PRACTITIONER

## 2022-01-01 PROCEDURE — 36620 INSERTION CATHETER ARTERY: CPT | Mod: ,,, | Performed by: INTERNAL MEDICINE

## 2022-01-01 PROCEDURE — 99223 PR INITIAL HOSPITAL CARE,LEVL III: ICD-10-PCS | Mod: ,,, | Performed by: NURSE PRACTITIONER

## 2022-01-01 PROCEDURE — 3074F SYST BP LT 130 MM HG: CPT | Mod: CPTII,S$GLB,, | Performed by: NURSE PRACTITIONER

## 2022-01-01 PROCEDURE — 85027 COMPLETE CBC AUTOMATED: CPT | Performed by: EMERGENCY MEDICINE

## 2022-01-01 PROCEDURE — 99214 PR OFFICE/OUTPT VISIT, EST, LEVL IV, 30-39 MIN: ICD-10-PCS | Mod: S$GLB,,, | Performed by: NURSE PRACTITIONER

## 2022-01-01 PROCEDURE — 99999 PR PBB SHADOW E&M-EST. PATIENT-LVL I: ICD-10-PCS | Mod: PBBFAC,GC,, | Performed by: STUDENT IN AN ORGANIZED HEALTH CARE EDUCATION/TRAINING PROGRAM

## 2022-01-01 PROCEDURE — 82947 ASSAY GLUCOSE BLOOD QUANT: CPT | Performed by: HOSPITALIST

## 2022-01-01 PROCEDURE — 25000003 PHARM REV CODE 250: Performed by: HOSPITALIST

## 2022-01-01 PROCEDURE — 27000221 HC OXYGEN, UP TO 24 HOURS

## 2022-01-01 PROCEDURE — 3074F PR MOST RECENT SYSTOLIC BLOOD PRESSURE < 130 MM HG: ICD-10-PCS | Mod: CPTII,S$GLB,, | Performed by: NURSE PRACTITIONER

## 2022-01-01 PROCEDURE — 99900026 HC AIRWAY MAINTENANCE (STAT)

## 2022-01-01 PROCEDURE — 99291 CRITICAL CARE FIRST HOUR: CPT | Mod: 25

## 2022-01-01 PROCEDURE — 36415 COLL VENOUS BLD VENIPUNCTURE: CPT | Performed by: HOSPITALIST

## 2022-01-01 PROCEDURE — 99292 CRITICAL CARE ADDL 30 MIN: CPT | Mod: 25,GC,, | Performed by: INTERNAL MEDICINE

## 2022-01-01 PROCEDURE — 99292 PR CRITICAL CARE, ADDL 30 MIN: ICD-10-PCS | Mod: 25,GC,, | Performed by: INTERNAL MEDICINE

## 2022-01-01 PROCEDURE — 99999 PR PBB SHADOW E&M-EST. PATIENT-LVL III: CPT | Mod: PBBFAC,,, | Performed by: NURSE PRACTITIONER

## 2022-01-01 PROCEDURE — 25000003 PHARM REV CODE 250

## 2022-01-01 PROCEDURE — 84484 ASSAY OF TROPONIN QUANT: CPT | Performed by: HOSPITALIST

## 2022-01-01 PROCEDURE — 3008F BODY MASS INDEX DOCD: CPT | Mod: CPTII,S$GLB,, | Performed by: NURSE PRACTITIONER

## 2022-01-01 PROCEDURE — 81000 URINALYSIS NONAUTO W/SCOPE: CPT | Mod: 59 | Performed by: EMERGENCY MEDICINE

## 2022-01-01 PROCEDURE — 84145 PROCALCITONIN (PCT): CPT | Performed by: EMERGENCY MEDICINE

## 2022-01-01 PROCEDURE — 1160F PR REVIEW ALL MEDS BY PRESCRIBER/CLIN PHARMACIST DOCUMENTED: ICD-10-PCS | Mod: CPTII,S$GLB,, | Performed by: NURSE PRACTITIONER

## 2022-01-01 PROCEDURE — U0002 COVID-19 LAB TEST NON-CDC: HCPCS | Performed by: EMERGENCY MEDICINE

## 2022-01-01 PROCEDURE — 80053 COMPREHEN METABOLIC PANEL: CPT | Performed by: EMERGENCY MEDICINE

## 2022-01-01 PROCEDURE — 1159F PR MEDICATION LIST DOCUMENTED IN MEDICAL RECORD: ICD-10-PCS | Mod: CPTII,S$GLB,, | Performed by: NURSE PRACTITIONER

## 2022-01-01 PROCEDURE — 96366 THER/PROPH/DIAG IV INF ADDON: CPT

## 2022-01-01 PROCEDURE — 3008F PR BODY MASS INDEX (BMI) DOCUMENTED: ICD-10-PCS | Mod: CPTII,S$GLB,, | Performed by: NURSE PRACTITIONER

## 2022-01-01 PROCEDURE — 63600175 PHARM REV CODE 636 W HCPCS: Performed by: INTERNAL MEDICINE

## 2022-01-01 PROCEDURE — 92950 HEART/LUNG RESUSCITATION CPR: CPT

## 2022-01-01 PROCEDURE — 99214 OFFICE O/P EST MOD 30 MIN: CPT | Mod: GC,S$GLB,, | Performed by: STUDENT IN AN ORGANIZED HEALTH CARE EDUCATION/TRAINING PROGRAM

## 2022-01-01 PROCEDURE — 99291 CRITICAL CARE FIRST HOUR: CPT | Mod: 25,GC,, | Performed by: INTERNAL MEDICINE

## 2022-01-01 PROCEDURE — 83605 ASSAY OF LACTIC ACID: CPT | Mod: 91 | Performed by: EMERGENCY MEDICINE

## 2022-01-01 PROCEDURE — 82803 BLOOD GASES ANY COMBINATION: CPT

## 2022-01-01 PROCEDURE — 4010F ACE/ARB THERAPY RXD/TAKEN: CPT | Mod: CPTII,S$GLB,, | Performed by: NURSE PRACTITIONER

## 2022-01-01 PROCEDURE — 36600 WITHDRAWAL OF ARTERIAL BLOOD: CPT

## 2022-01-01 PROCEDURE — 63600175 PHARM REV CODE 636 W HCPCS: Performed by: HOSPITALIST

## 2022-01-01 PROCEDURE — 83735 ASSAY OF MAGNESIUM: CPT | Performed by: EMERGENCY MEDICINE

## 2022-01-01 PROCEDURE — 85007 BL SMEAR W/DIFF WBC COUNT: CPT | Performed by: EMERGENCY MEDICINE

## 2022-01-01 PROCEDURE — 3078F PR MOST RECENT DIASTOLIC BLOOD PRESSURE < 80 MM HG: ICD-10-PCS | Mod: CPTII,S$GLB,, | Performed by: NURSE PRACTITIONER

## 2022-01-01 PROCEDURE — 37799 UNLISTED PX VASCULAR SURGERY: CPT

## 2022-01-01 PROCEDURE — 99291 PR CRITICAL CARE, E/M 30-74 MINUTES: ICD-10-PCS | Mod: 25,,, | Performed by: INTERNAL MEDICINE

## 2022-01-01 PROCEDURE — 85610 PROTHROMBIN TIME: CPT | Performed by: EMERGENCY MEDICINE

## 2022-01-01 PROCEDURE — 25000003 PHARM REV CODE 250: Performed by: INTERNAL MEDICINE

## 2022-01-01 PROCEDURE — 25000003 PHARM REV CODE 250: Performed by: STUDENT IN AN ORGANIZED HEALTH CARE EDUCATION/TRAINING PROGRAM

## 2022-01-01 PROCEDURE — 93005 ELECTROCARDIOGRAM TRACING: CPT

## 2022-01-01 PROCEDURE — 96365 THER/PROPH/DIAG IV INF INIT: CPT

## 2022-01-01 PROCEDURE — A4216 STERILE WATER/SALINE, 10 ML: HCPCS | Performed by: STUDENT IN AN ORGANIZED HEALTH CARE EDUCATION/TRAINING PROGRAM

## 2022-01-01 PROCEDURE — 80307 DRUG TEST PRSMV CHEM ANLYZR: CPT | Performed by: EMERGENCY MEDICINE

## 2022-01-01 PROCEDURE — 99999 PR PBB SHADOW E&M-EST. PATIENT-LVL III: ICD-10-PCS | Mod: PBBFAC,,, | Performed by: NURSE PRACTITIONER

## 2022-01-01 PROCEDURE — 83880 ASSAY OF NATRIURETIC PEPTIDE: CPT | Performed by: EMERGENCY MEDICINE

## 2022-01-01 PROCEDURE — 99999 PR PBB SHADOW E&M-EST. PATIENT-LVL I: CPT | Mod: PBBFAC,GC,, | Performed by: STUDENT IN AN ORGANIZED HEALTH CARE EDUCATION/TRAINING PROGRAM

## 2022-01-01 PROCEDURE — 1160F RVW MEDS BY RX/DR IN RCRD: CPT | Mod: CPTII,S$GLB,, | Performed by: NURSE PRACTITIONER

## 2022-01-01 PROCEDURE — 20000000 HC ICU ROOM

## 2022-01-01 PROCEDURE — 51702 INSERT TEMP BLADDER CATH: CPT

## 2022-01-01 PROCEDURE — 99291 PR CRITICAL CARE, E/M 30-74 MINUTES: ICD-10-PCS | Mod: 25,GC,, | Performed by: INTERNAL MEDICINE

## 2022-01-01 PROCEDURE — 36620 ARTERIAL LINE: ICD-10-PCS | Mod: ,,, | Performed by: INTERNAL MEDICINE

## 2022-01-01 PROCEDURE — 99214 PR OFFICE/OUTPT VISIT, EST, LEVL IV, 30-39 MIN: ICD-10-PCS | Mod: GC,S$GLB,, | Performed by: STUDENT IN AN ORGANIZED HEALTH CARE EDUCATION/TRAINING PROGRAM

## 2022-01-01 PROCEDURE — 87040 BLOOD CULTURE FOR BACTERIA: CPT | Performed by: EMERGENCY MEDICINE

## 2022-01-01 PROCEDURE — 99497 PR ADVNCD CARE PLAN 30 MIN: ICD-10-PCS | Mod: 25,,, | Performed by: NURSE PRACTITIONER

## 2022-01-01 PROCEDURE — 93010 ELECTROCARDIOGRAM REPORT: CPT | Mod: ,,, | Performed by: INTERNAL MEDICINE

## 2022-01-01 PROCEDURE — 94761 N-INVAS EAR/PLS OXIMETRY MLT: CPT

## 2022-01-01 PROCEDURE — C1751 CATH, INF, PER/CENT/MIDLINE: HCPCS

## 2022-01-01 PROCEDURE — 83605 ASSAY OF LACTIC ACID: CPT

## 2022-01-01 PROCEDURE — 3078F DIAST BP <80 MM HG: CPT | Mod: CPTII,S$GLB,, | Performed by: NURSE PRACTITIONER

## 2022-01-01 PROCEDURE — 4010F PR ACE/ARB THEARPY RXD/TAKEN: ICD-10-PCS | Mod: CPTII,S$GLB,, | Performed by: NURSE PRACTITIONER

## 2022-01-01 PROCEDURE — 84484 ASSAY OF TROPONIN QUANT: CPT | Mod: 91 | Performed by: EMERGENCY MEDICINE

## 2022-01-01 PROCEDURE — 36569 INSJ PICC 5 YR+ W/O IMAGING: CPT

## 2022-01-01 PROCEDURE — 94002 VENT MGMT INPAT INIT DAY: CPT

## 2022-01-01 PROCEDURE — 1159F MED LIST DOCD IN RCRD: CPT | Mod: CPTII,S$GLB,, | Performed by: NURSE PRACTITIONER

## 2022-01-01 PROCEDURE — 99497 ADVNCD CARE PLAN 30 MIN: CPT | Mod: 25,,, | Performed by: NURSE PRACTITIONER

## 2022-01-01 RX ORDER — SODIUM CHLORIDE 0.9 % (FLUSH) 0.9 %
10 SYRINGE (ML) INJECTION
Status: DISCONTINUED | OUTPATIENT
Start: 2022-01-01 | End: 2022-01-01 | Stop reason: HOSPADM

## 2022-01-01 RX ORDER — EPINEPHRINE 0.1 MG/ML
INJECTION INTRAVENOUS CODE/TRAUMA/SEDATION MEDICATION
Status: DISCONTINUED | OUTPATIENT
Start: 2022-01-01 | End: 2022-01-01 | Stop reason: HOSPADM

## 2022-01-01 RX ORDER — AMITRIPTYLINE HYDROCHLORIDE 150 MG/1
300 TABLET ORAL NIGHTLY
Qty: 60 TABLET | Refills: 2 | Status: SHIPPED | OUTPATIENT
Start: 2022-01-01 | End: 2022-01-01

## 2022-01-01 RX ORDER — AMITRIPTYLINE HYDROCHLORIDE 150 MG/1
300 TABLET ORAL NIGHTLY
Qty: 180 TABLET | Refills: 1 | Status: SHIPPED | OUTPATIENT
Start: 2022-01-01 | End: 2022-01-01

## 2022-01-01 RX ORDER — SODIUM CHLORIDE 0.9 % (FLUSH) 0.9 %
10 SYRINGE (ML) INJECTION EVERY 6 HOURS
Status: DISCONTINUED | OUTPATIENT
Start: 2022-01-01 | End: 2022-01-01 | Stop reason: HOSPADM

## 2022-01-01 RX ORDER — NOREPINEPHRINE BITARTRATE/D5W 4MG/250ML
PLASTIC BAG, INJECTION (ML) INTRAVENOUS
Status: COMPLETED
Start: 2022-01-01 | End: 2022-01-01

## 2022-01-01 RX ORDER — INSULIN ASPART 100 [IU]/ML
0-5 INJECTION, SOLUTION INTRAVENOUS; SUBCUTANEOUS EVERY 6 HOURS PRN
Status: DISCONTINUED | OUTPATIENT
Start: 2022-01-01 | End: 2022-01-01 | Stop reason: HOSPADM

## 2022-01-01 RX ORDER — NOREPINEPHRINE BITARTRATE/D5W 4MG/250ML
0.05 PLASTIC BAG, INJECTION (ML) INTRAVENOUS CONTINUOUS
Status: DISCONTINUED | OUTPATIENT
Start: 2022-01-01 | End: 2022-01-01 | Stop reason: SDUPTHER

## 2022-01-01 RX ORDER — SODIUM CHLORIDE 9 MG/ML
INJECTION, SOLUTION INTRAVENOUS CONTINUOUS
Status: DISCONTINUED | OUTPATIENT
Start: 2022-01-01 | End: 2022-01-01 | Stop reason: HOSPADM

## 2022-01-01 RX ORDER — ENOXAPARIN SODIUM 100 MG/ML
40 INJECTION SUBCUTANEOUS EVERY 24 HOURS
Status: DISCONTINUED | OUTPATIENT
Start: 2022-01-01 | End: 2022-01-01

## 2022-01-01 RX ORDER — ALPRAZOLAM 1 MG/1
1 TABLET ORAL DAILY PRN
Qty: 25 TABLET | Refills: 5 | Status: SHIPPED | OUTPATIENT
Start: 2022-01-01

## 2022-01-01 RX ORDER — ALPRAZOLAM 1 MG/1
1 TABLET ORAL DAILY PRN
Qty: 30 TABLET | Refills: 1 | Status: SHIPPED | OUTPATIENT
Start: 2022-01-01 | End: 2022-01-01 | Stop reason: SDUPTHER

## 2022-01-01 RX ORDER — GLUCAGON 1 MG
1 KIT INJECTION
Status: DISCONTINUED | OUTPATIENT
Start: 2022-01-01 | End: 2022-01-01 | Stop reason: HOSPADM

## 2022-01-01 RX ORDER — NOREPINEPHRINE BITARTRATE/D5W 4MG/250ML
0.05 PLASTIC BAG, INJECTION (ML) INTRAVENOUS CONTINUOUS
Status: DISCONTINUED | OUTPATIENT
Start: 2022-01-01 | End: 2022-01-01

## 2022-01-01 RX ORDER — ALPRAZOLAM 1 MG/1
1 TABLET ORAL DAILY PRN
Qty: 25 TABLET | Refills: 1 | Status: SHIPPED | OUTPATIENT
Start: 2022-01-01 | End: 2022-01-01 | Stop reason: SDUPTHER

## 2022-01-01 RX ORDER — AMITRIPTYLINE HYDROCHLORIDE 100 MG/1
300 TABLET ORAL NIGHTLY
Qty: 270 TABLET | Refills: 1 | Status: SHIPPED | OUTPATIENT
Start: 2022-01-01

## 2022-01-01 RX ORDER — SODIUM BICARBONATE 1 MEQ/ML
SYRINGE (ML) INTRAVENOUS CODE/TRAUMA/SEDATION MEDICATION
Status: DISCONTINUED | OUTPATIENT
Start: 2022-01-01 | End: 2022-01-01 | Stop reason: HOSPADM

## 2022-01-01 RX ADMIN — Medication 0.05 MCG/KG/MIN: at 06:09

## 2022-01-01 RX ADMIN — AMIODARONE HYDROCHLORIDE 150 MG: 1.5 INJECTION, SOLUTION INTRAVENOUS at 07:09

## 2022-01-01 RX ADMIN — AMIODARONE HYDROCHLORIDE 1 MG/MIN: 1.8 INJECTION, SOLUTION INTRAVENOUS at 07:09

## 2022-01-01 RX ADMIN — INSULIN DETEMIR 30 UNITS: 100 INJECTION, SOLUTION SUBCUTANEOUS at 12:09

## 2022-01-01 RX ADMIN — EPINEPHRINE 1 MG: 0.1 INJECTION, SOLUTION ENDOTRACHEAL; INTRACARDIAC; INTRAVENOUS at 02:09

## 2022-01-01 RX ADMIN — VANCOMYCIN HYDROCHLORIDE 2000 MG: 500 INJECTION, POWDER, LYOPHILIZED, FOR SOLUTION INTRAVENOUS at 09:09

## 2022-01-01 RX ADMIN — Medication 10 ML: at 12:09

## 2022-01-01 RX ADMIN — Medication 1 MCG/KG/MIN: at 07:09

## 2022-01-01 RX ADMIN — Medication 1 MCG/KG/MIN: at 08:09

## 2022-01-01 RX ADMIN — SODIUM BICARBONATE 100 MEQ: 84 INJECTION, SOLUTION INTRAVENOUS at 01:09

## 2022-01-01 RX ADMIN — Medication 1 MCG/KG/MIN: at 09:09

## 2022-01-01 RX ADMIN — AMIODARONE HYDROCHLORIDE 0.5 MG/MIN: 1.8 INJECTION, SOLUTION INTRAVENOUS at 01:09

## 2022-01-01 RX ADMIN — NOREPINEPHRINE BITARTRATE 0.9 MCG/KG/MIN: 1 INJECTION, SOLUTION, CONCENTRATE INTRAVENOUS at 10:09

## 2022-01-01 RX ADMIN — SODIUM CHLORIDE, SODIUM LACTATE, POTASSIUM CHLORIDE, AND CALCIUM CHLORIDE 1000 ML: .6; .31; .03; .02 INJECTION, SOLUTION INTRAVENOUS at 08:09

## 2022-01-01 RX ADMIN — INSULIN ASPART 5 UNITS: 100 INJECTION, SOLUTION INTRAVENOUS; SUBCUTANEOUS at 12:09

## 2022-01-01 RX ADMIN — PIPERACILLIN AND TAZOBACTAM 4.5 G: 4; .5 INJECTION, POWDER, LYOPHILIZED, FOR SOLUTION INTRAVENOUS; PARENTERAL at 11:09

## 2022-01-01 RX ADMIN — SODIUM CHLORIDE: 0.9 INJECTION, SOLUTION INTRAVENOUS at 10:09

## 2022-02-11 PROBLEM — F41.0 PANIC DISORDER: Status: ACTIVE | Noted: 2022-01-01

## 2022-02-11 NOTE — PROGRESS NOTES
2/11/2022 2:13 PM   Marlena DODGE   1960   [id number]           OUTPATIENT PSYCHIATRY FOLLOW- UP VISIT    Reason for Encounter:  Marlena DODGE, a 61 y.o. female,who presents today for follow up of MDD, DC.  Met with patient.    Interval History and Content of Current Session:      Patient Current MedsL   Amitriptyline 150mg QHS  Alprazolam 1mg PRN    Today, patient that since her last appointment, she is taking 2 amitriptyline 150mg tablets nightly for sleep - reports this has been helpful for her to get to sleep. Reports waking up well rested and feeling like she can go about her day. Denies blurred vision, dry mouth, constipation the following day.     Patient reports continued issues with anxiety - reports she has had 2 recent deaths in her family and this has causes a lot of stress on the family. Patient did notice increased irritability and panic attacks during this time period. Most recent passing was within the last few weeks. Continues to have anxiety as this put a lot of stress on her marriage and financial situation. Patient recently had to increase her alprazolam to twice a day recently due to increase in stress. Reports having about 10 leftover since this increased dosage. Reports she typically goes 2-3 days a week where she is not taking xanax as her anxiety is manageable.        Social stressors:  - no new mentioned     Psychiatric Review Of Systems - Is patient experiencing or having changes in:      sleep: improved, getting 6-7 hours of sleep, reports more good nights of sleep since increased dose  appetite: decreased appetite lately, reports she has lost 10lbs in last few months. Continues to eat despite low appetite   weight: continues to have some weight loss,   energy/anergy: good, continues to go to the gym 3x/week - uses bike and [employer]coming machine   interest/pleasure/anhedonia: some, has been getting out a little bit more   somatic symptoms: no  guilty/hopelessness: does feel  guilt - has negative ruminations about herself when she says something regrettable   concentration: improved  S.I.B.s/risky behavior: no  SI/SA:  no    anxiety/panic: yes - see above, largely situational   Agoraphobia:  no  Social phobia:  no  Recurrent nightmares:  no  hyper startle response:  no  Avoidance: no  Recurrent thoughts:  no  Recurrent behaviors:  no    Irritability: yes  Racing thoughts: no  Impulsive behaviors: no  Pressured speech:  no    Paranoia:no  Delusions: no  AVH:no      Risk Parameters:  Patient reports no suicidal ideation  Patient reports no homicidal ideation  Patient reports no self-injurious behavior  Patient reports no violent behavior    Psychotropic medication review      Current meds-    Compliance: yes    Side effects: some drowsiness in AM after taking amitryptiline       Substance use  Tobacco- denies   ETOH- denies   Illicit substances- denies     Review of Systems     Past Medical, Family and Social History: The patient's past medical, family and social history have been reviewed and updated as appropriate within the electronic medical record - see encounter notes.    Medical Review of Symptoms  History obtained from the patient   General : NO chills or fever   Respiratory: NO cough, shortness of breath   Cardiovascular: NO chest pain, palpitations or racing heart   Gastrointestinal: NO nausea, vomiting, constipation or diarrhea   Neurological: NO confusion, dizziness, headaches or tremors   Psychiatric: please see HPI     Objective     ALL MEDICATIONS:    Current Outpatient Medications:     ALPRAZolam (XANAX) 1 MG tablet, Take 1 tablet (1 mg total) by mouth daily as needed for Anxiety. May take 1/2 pill as needed as well, Disp: 30 tablet, Rfl: 1    amitriptyline (ELAVIL) 100 MG tablet, Take 2 tablets (200 mg total) by mouth every evening., Disp: 30 tablet, Rfl: 2    atorvastatin (LIPITOR) 20 MG tablet, Take 20 mg by mouth once daily., Disp: , Rfl:     carvedilol  (COREG) 6.25 MG tablet, TAKE 2 TABLET BY MOUTH EVERY MORNING AND EVERY EVENING. TAKE MORNING DOSE RIGHT BEFORE LISINOPRIL, Disp: 180 tablet, Rfl: 0    cholecalciferol, vitamin D3, (VITAMIN D3) 4,000 unit Cap, Take 4,000 mg by mouth once daily., Disp: 90 capsule, Rfl: 3    famotidine (PEPCID) 20 MG tablet, Take 1 tablet (20 mg total) by mouth 2 (two) times daily., Disp: 20 tablet, Rfl: 0    furosemide (LASIX) 40 MG tablet, Take 1 tablet (40 mg total) by mouth once daily., Disp: 90 tablet, Rfl: 3    hydrocodone-acetaminophen 5-325mg (NORCO) 5-325 mg per tablet, Take 1 tablet by mouth every 8 (eight) hours as needed for Pain., Disp: 10 tablet, Rfl: 0    latanoprost 0.005 % ophthalmic solution, , Disp: , Rfl: 1    lisinopril (PRINIVIL,ZESTRIL) 40 MG tablet, Take 1 tablet (40 mg total) by mouth once daily., Disp: 90 tablet, Rfl: 3    mecobal/levomefolat Ca/B6 phos (METANX ORAL), Take by mouth., Disp: , Rfl:     metformin (GLUCOPHAGE) 500 MG tablet, TAKE 2 TABLETS BY MOUTH TWICE DAILY, Disp: 120 tablet, Rfl: 0    omega-3 acid ethyl esters (LOVAZA) 1 gram capsule, TAKE 1 CAPSULE BY MOUTH TWICE DAILY, Disp: 180 capsule, Rfl: 0    ondansetron (ZOFRAN) 4 MG tablet, Take 1 tablet (4 mg total) by mouth every 6 (six) hours as needed., Disp: 12 tablet, Rfl: 0    perphenazine-amitriptyline 4-50 mg Tab, Take 2 tablets by mouth at bedtime, Disp: 60 each, Rfl: 3    potassium chloride (KLOR-CON) 10 MEQ TbSR, Take 2 tablets (20 mEq total) by mouth once daily., Disp: 180 tablet, Rfl: 3    pravastatin (PRAVACHOL) 40 MG tablet, Take 1 tablet (40 mg total) by mouth once daily., Disp: 90 tablet, Rfl: 3    SACUBITRIL/VALSARTAN (ENTRESTO ORAL), Take by mouth., Disp: , Rfl:     ALLERGIES:  Review of patient's allergies indicates:   Allergen Reactions    Aspirin Nausea And Vomiting    Codeine Nausea And Vomiting       RELEVANT LABS/STUDIES:    Lab Results   Component Value Date    WBC 6.60 06/05/2015    HGB 12.7 06/05/2015     HCT 38.2 06/05/2015    MCV 91 06/05/2015     06/05/2015     BMP  Lab Results   Component Value Date     06/06/2015    K 4.1 06/06/2015     06/06/2015    CO2 25 06/06/2015    BUN 5 (L) 06/06/2015    CREATININE 0.7 06/06/2015    CALCIUM 8.5 (L) 06/06/2015    ANIONGAP 8 06/06/2015    ESTGFRAFRICA >60 06/06/2015    EGFRNONAA >60 06/06/2015     Lab Results   Component Value Date     (H) 06/06/2015     (H) 06/06/2015    ALKPHOS 220 (H) 06/06/2015    BILITOT 0.5 06/06/2015     Lab Results   Component Value Date    TSH 0.389 (L) 06/05/2015     Lab Results   Component Value Date    HGBA1C 6.9 (H) 10/18/2021       Constitutional  Vitals:  Most recent vital signs, dated less than 90 days prior to this appointment, were reviewed.    Vitals:    02/11/22 1447   BP: 120/76   Pulse: 97   Weight: 92.8 kg (204 lb 11.2 oz)            PHYSICAL EXAM  General: well developed, well nourished  Neurologic:   Gait: Normal   Psychomotor signs:  No involuntary movements or tremor  AIMS: none    PSYCHIATRIC EXAM:     Mental Status Exam:  Appearance: unremarkable, age appropriate  Behavior/Cooperation: normal, cooperative  Speech: normal tone, normal rate, normal pitch, normal volume  Language: uses words appropriately; NO aphasia or dysarthria  Mood: anxious  Affect: full, mood congruent   Thought Process: normal and logical  Thought Content: normal, no suicidality, no homicidality, delusions, or paranoia  Level of Consciousness: Alert and Oriented x3  Memory:  Intact  Attention/concentration: appropriate for age/education.   Fund of Knowledge: appears adequate  Insight:   Intact  Judgment: Intact     Assessment and Diagnosis   Status/Progress: Based on the examination today, the patient's problem(s) is/are improved and not ideally controlled.  New problems have not been presented today.   Co-morbidities are not complicating management of the primary condition.  Patient continues to progress as we titrate to  therapeutic dose of amitriptyline. Patient continues to use alprazolam as prescribed and per  filling appropriately. Not using 2-3 days a week and thus lower risk of developing significant dependence or withdrawal - though this continues to be a risk for this patient.  Discussed intention to lower dosage once she is on therapeutic dosage of amitriptyline in order to minimize falls, dependence, etc - though currently not a good time due to number of recent deaths and other situational stressors.     General Impression:       ICD-10-CM ICD-9-CM   1. Moderate episode of recurrent major depressive disorder  F33.1 296.32   2. Generalized anxiety disorder  F41.1 300.02         Intervention/Counseling/Treatment Plan   · Medication Management:   · Increase amitriptyline to 300mg QHS - discussed potential anticholigeric effects as well as cardiac effects. Per patient she recently saw cardiologist and EKG was reported to be without significant issue. Will bring copy of EKG to next appointment in order to monitor effect on QTc. Patient reports taking at this dose over last few months without significant adverse effect. Discussed anticholinergic side effects (constipation, dry mouth, blurred vision, etc) and to inform this provider if she starts to experience these effects.   · Continue alprazolam 1mg PRN daily - encouraged patient to split tablet in half when taking in order to avoid significant AE - dsicussed potential for dependence, rationale for using in the short term and plan to start taper down at next appointment.      · Labs: reviewed most recent  · The treatment plan and follow up plan were reviewed with the patient.  · Discussed with patient informed consent, risks vs. benefits, alternative treatments, side effect profile and the inherent unpredictability of individual responses to these treatments. The patient expresses understanding of the above and displays the capacity to agree with this current plan and had  no other questions.  · Encouraged Patient to keep future appointments.   · Take medications as prescribed and abstain from substance abuse.   · In the event of an emergency patient was advised to go to the emergency room.    Return to Clinic: 3 months    > than 50% of total time spend on coordination of care and counseling   (which included pts differential diagnosis and prognosis for psychiatric conditions, risks, benefits of treatments, instructions and adherence to treatment plan, risk reduction, reviewing current psychiatric medication regimen, medical problems and social stressors. In addtion to possible discussion with other healthcare provider/s)    Add on Psychotherapy time:0  Total Face time: 25 minutes     Ross Wiedemann, MD  [facility] PGY-3  [facility]

## 2022-02-17 NOTE — PROGRESS NOTES
02/17/2022: I reviewed and discussed this patient's treatment plan and diagnosis with Dr Wiedemann and agree with both at this time. MELITON Guillen MD

## 2022-06-10 NOTE — PROGRESS NOTES
"    6/10/2022 2:13 PM   Marlena DODGE   1960   [id number]           OUTPATIENT PSYCHIATRY FOLLOW- UP VISIT    Reason for Encounter:  Marlena DODGE, a 61 y.o. female,who presents today for follow up of MDD, DC.  Met with patient.    Interval History and Content of Current Session:      Patient Current MedsL   Amitriptyline 300mg QHS  Alprazolam 1mg PRN    Today, patient that since her last appointment, things are going ok. Reports she is still dealing with grief from the passing of her mother and brother which happened over the course of three months. States she is also having to help take care of her sister who has her own set of social problems. Has had to help her sister out financially a number of times which is causing stress for she and her .     Patient reports issues with sleep at night with the increased anxiety, stress. Reports pain in her stomach -like her stomach is in knots- does have bowel movements on a regular basis "but not like I should." Reports BM roughly 3x/week. Reports she goes to see her PCP in two weeks.     Patient reports having to use the xanax nearly every day for the past week with the recent increase in stressors - typically she takes 3-4x/week. Patient reports she does not want to make any changes at this time as her stressors are situational. Reports taking hydrocodone-acetaminophen for pain 1-2x/day - requires back surgery.     Social stressors:  - death of mother, brother.     Psychiatric Review Of Systems - Is patient experiencing or having changes in:      sleep: worsened - has trouble falling asleep - gets about 6 hours/night   appetite: reports having lost 35lbs in the last few months - states she is eating less   weight: continues to have significant weight loss   energy/anergy: good, continues to go to the gym 3-4x/week - uses bike and rowing machine   interest/pleasure/anhedonia: yes   somatic symptoms: no  guilty/hopelessness: does feel guilt - has " negative ruminations about herself when she says something regrettable   concentration: improved  S.I.B.s/risky behavior: no  SI/SA:  no    anxiety/panic: worsening   Agoraphobia:  no  Social phobia:  no  Recurrent nightmares:  no  hyper startle response:  no  Avoidance: no  Recurrent thoughts:  no  Recurrent behaviors:  no    Irritability: yes  Racing thoughts: no  Impulsive behaviors: no  Pressured speech:  no    Paranoia:no  Delusions: no  AVH:no      Risk Parameters:  Patient reports no suicidal ideation  Patient reports no homicidal ideation  Patient reports no self-injurious behavior  Patient reports no violent behavior    Psychotropic medication review      Current meds-    Compliance: yes    Side effects: some drowsiness in AM after taking amitryptiline , potential constipation from amitriptlyine       Substance use  Tobacco- denies   ETOH- denies   Illicit substances- denies     Review of Systems     Past Medical, Family and Social History: The patient's past medical, family and social history have been reviewed and updated as appropriate within the electronic medical record - see encounter notes.    Medical Review of Symptoms  History obtained from the patient   General : NO chills or fever   Respiratory: NO cough, shortness of breath   Cardiovascular: NO chest pain, palpitations or racing heart   Gastrointestinal: NO nausea, vomiting, constipation or diarrhea   Neurological: NO confusion, dizziness, headaches or tremors   Psychiatric: please see HPI     Objective     ALL MEDICATIONS:    Current Outpatient Medications:     ALPRAZolam (XANAX) 1 MG tablet, Take 1 tablet (1 mg total) by mouth daily as needed for Anxiety. May take 1/2 pill as needed as well, Disp: 30 tablet, Rfl: 0    amitriptyline (ELAVIL) 150 MG Tab, Take 2 tablets (300 mg total) by mouth every evening., Disp: 60 tablet, Rfl: 2    atorvastatin (LIPITOR) 20 MG tablet, Take 20 mg by mouth once daily., Disp: , Rfl:     carvedilol  (COREG) 6.25 MG tablet, TAKE 2 TABLET BY MOUTH EVERY MORNING AND EVERY EVENING. TAKE MORNING DOSE RIGHT BEFORE LISINOPRIL, Disp: 180 tablet, Rfl: 0    cholecalciferol, vitamin D3, (VITAMIN D3) 4,000 unit Cap, Take 4,000 mg by mouth once daily., Disp: 90 capsule, Rfl: 3    famotidine (PEPCID) 20 MG tablet, Take 1 tablet (20 mg total) by mouth 2 (two) times daily., Disp: 20 tablet, Rfl: 0    furosemide (LASIX) 40 MG tablet, Take 1 tablet (40 mg total) by mouth once daily., Disp: 90 tablet, Rfl: 3    hydrocodone-acetaminophen 5-325mg (NORCO) 5-325 mg per tablet, Take 1 tablet by mouth every 8 (eight) hours as needed for Pain., Disp: 10 tablet, Rfl: 0    latanoprost 0.005 % ophthalmic solution, , Disp: , Rfl: 1    lisinopril (PRINIVIL,ZESTRIL) 40 MG tablet, Take 1 tablet (40 mg total) by mouth once daily., Disp: 90 tablet, Rfl: 3    mecobal/levomefolat Ca/B6 phos (METANX ORAL), Take by mouth., Disp: , Rfl:     metformin (GLUCOPHAGE) 500 MG tablet, TAKE 2 TABLETS BY MOUTH TWICE DAILY, Disp: 120 tablet, Rfl: 0    omega-3 acid ethyl esters (LOVAZA) 1 gram capsule, TAKE 1 CAPSULE BY MOUTH TWICE DAILY, Disp: 180 capsule, Rfl: 0    ondansetron (ZOFRAN) 4 MG tablet, Take 1 tablet (4 mg total) by mouth every 6 (six) hours as needed., Disp: 12 tablet, Rfl: 0    perphenazine-amitriptyline 4-50 mg Tab, Take 2 tablets by mouth at bedtime, Disp: 60 each, Rfl: 3    potassium chloride (KLOR-CON) 10 MEQ TbSR, Take 2 tablets (20 mEq total) by mouth once daily., Disp: 180 tablet, Rfl: 3    pravastatin (PRAVACHOL) 40 MG tablet, Take 1 tablet (40 mg total) by mouth once daily., Disp: 90 tablet, Rfl: 3    SACUBITRIL/VALSARTAN (ENTRESTO ORAL), Take by mouth., Disp: , Rfl:     ALLERGIES:  Review of patient's allergies indicates:   Allergen Reactions    Aspirin Nausea And Vomiting    Codeine Nausea And Vomiting       RELEVANT LABS/STUDIES:    Lab Results   Component Value Date    WBC 6.60 06/05/2015    HGB 12.7 06/05/2015     HCT 38.2 06/05/2015    MCV 91 06/05/2015     06/05/2015     BMP  Lab Results   Component Value Date     06/06/2015    K 4.1 06/06/2015     06/06/2015    CO2 25 06/06/2015    BUN 5 (L) 06/06/2015    CREATININE 0.7 06/06/2015    CALCIUM 8.5 (L) 06/06/2015    ANIONGAP 8 06/06/2015    ESTGFRAFRICA >60 06/06/2015    EGFRNONAA >60 06/06/2015     Lab Results   Component Value Date     (H) 06/06/2015     (H) 06/06/2015    ALKPHOS 220 (H) 06/06/2015    BILITOT 0.5 06/06/2015     Lab Results   Component Value Date    TSH 0.389 (L) 06/05/2015     Lab Results   Component Value Date    HGBA1C 6.9 (H) 10/18/2021       Constitutional  Vitals:  Most recent vital signs, dated less than 90 days prior to this appointment, were reviewed.    There were no vitals filed for this visit.         PHYSICAL EXAM  General: well developed, well nourished  Neurologic:   Gait: Normal   Psychomotor signs:  No involuntary movements or tremor  AIMS: none    PSYCHIATRIC EXAM:     Mental Status Exam:  Appearance: unremarkable, age appropriate  Behavior/Cooperation: normal, cooperative  Speech: normal tone, normal rate, normal pitch, normal volume  Language: uses words appropriately; NO aphasia or dysarthria  Mood: anxious  Affect: full, mood congruent   Thought Process: normal and logical  Thought Content: normal, no suicidality, no homicidality, delusions, or paranoia  Level of Consciousness: Alert and Oriented x3  Memory:  Intact  Attention/concentration: appropriate for age/education.   Fund of Knowledge: appears adequate  Insight:   Intact  Judgment: Intact     Assessment and Diagnosis   Status/Progress: Based on the examination today, the patient's problem(s) is/are improved and not ideally controlled.  New problems have not been presented today.   Co-morbidities are not complicating management of the primary condition. Patient with some recent major life stressors resulting in temporary increase in utilization of  alprazolam. Extensive conversation had with patient about the dangers of controlled substances as well as using in tandem with opioid pain medications. Patient has been resistant to this idea but provider has insisted that continuing current regimen not appropriate. Patient does agree to decreased dosage and to decrease amount from 30 tablets a month to 25/month as a good jerry effort to decrease dependence on these medications. Explained rationale for starting other meds (duloxetine, etc) for anxiety issues as a way to decrease need for benzos.  Patient wishes to have some time to think about this before commiting to another medication.     General Impression:       ICD-10-CM ICD-9-CM   1. Moderate episode of recurrent major depressive disorder  F33.1 296.32   2. Generalized anxiety disorder  F41.1 300.02   3. Panic disorder  F41.0 300.01         Intervention/Counseling/Treatment Plan   · Medication Management:   · continue amitriptyline to 300mg QHS - discussed potential anticholigeric effects as well as cardiac effects. Per patient she recently saw cardiologist and EKG was reported to be without significant issue. Will bring copy of EKG to next appointment in order to monitor effect on QTc. Patient reports taking at this dose over last few months without significant adverse effect. Discussed anticholinergic side effects (constipation, dry mouth, blurred vision, etc) and to inform this provider if she starts to experience these effects.   · Continue alprazolam 1mg PRN daily - encouraged patient to split tablet in half when taking in order to avoid significant AE - dsicussed potential for dependence, rationale for using in the short term - decrease from 30 tablets/month to 25 tablets per month. Informed to 1/2 dose if needing hydrocodone to avoid excess sedation and possible risk for overdose. Patient reports understanding and desire to get off hydrocodone in the near future.      · Labs: reviewed most  recent  · The treatment plan and follow up plan were reviewed with the patient.  · Discussed with patient informed consent, risks vs. benefits, alternative treatments, side effect profile and the inherent unpredictability of individual responses to these treatments. The patient expresses understanding of the above and displays the capacity to agree with this current plan and had no other questions.  · Encouraged Patient to keep future appointments.   · Take medications as prescribed and abstain from substance abuse.   · In the event of an emergency patient was advised to go to the emergency room.    Return to Clinic: 2 months    > than 50% of total time spend on coordination of care and counseling   (which included pts differential diagnosis and prognosis for psychiatric conditions, risks, benefits of treatments, instructions and adherence to treatment plan, risk reduction, reviewing current psychiatric medication regimen, medical problems and social stressors. In addtion to possible discussion with other healthcare provider/s)    Add on Psychotherapy time:0  Total Face time: 25 minutes     Ross Wiedemann, MD  [facility] PGY-3  [facility]

## 2022-06-16 NOTE — PROGRESS NOTES
06/16/2022: I reviewed and discussed this patient's treatment plan and diagnosis with Dr Wiedemann and agree with both at this time.MELITON Guillen MD

## 2022-07-12 NOTE — PROGRESS NOTES
"Outpatient Psychiatry Follow-Up Visit (MD/NP)    7/12/2022    Clinical Status of Patient:  Outpatient (Ambulatory)    Chief Complaint:  Marlena DODGE is a 61 y.o. female who presents today for follow-up of depression and anxiety.  Met with patient.  Pt new to me.    Last visit was: 6/10/22 with resident. Chart and  reviewed.     Interval History and Content of Current Session:  Current Psychiatric Medications/changes  · Medication Management:   ? Increase amitriptyline to 300mg QHS - discussed potential anticholigeric effects as well as cardiac effects. Per patient she recently saw cardiologist and EKG was reported to be without significant issue. Will bring copy of EKG to next appointment in order to monitor effect on QTc. Patient reports taking at this dose over last few months without significant adverse effect. Discussed anticholinergic side effects (constipation, dry mouth, blurred vision, etc) and to inform this provider if she starts to experience these effects.   ? Continue alprazolam 1mg PRN daily - encouraged patient to split tablet in half when taking in order to avoid significant AE - dsicussed potential for dependence, rationale for using in the short term and plan to start taper down at next appointment.     Established therapeutic rapport and transition of care from previous provider. Pt presents bright affect and euthymic mood. Pt states, "I've been doing well". Denies any unmanaged mood, anxiety, or insomnia symptoms. Reports effective response to medications and denies side effects. Denies SI/HI/AVH. Will continue current medications.     Psychotherapy:  · Target symptoms: depression, anxiety   · Why chosen therapy is appropriate versus another modality: relevant to diagnosis  · Outcome monitoring methods: self-report  · Therapeutic intervention type: insight oriented psychotherapy  · Topics discussed/themes: difficulty managing affect in interpersonal relationships, building skills " sets for symptom management  · The patient's response to the intervention is accepting. The patient's progress toward treatment goals is good.   · Duration of intervention: 13 minutes.    Review of Systems   · PSYCHIATRIC: Pertinant items are noted in the narrative.  · CONSTITUTIONAL: No weight gain or loss.   · MUSCULOSKELETAL: No pain or stiffness of the joints.  · NEUROLOGIC: No weakness, sensory changes, seizures, confusion, memory loss, tremor or other abnormal movements.  · ENDOCRINE: No polydipsia or polyuria.  · INTEGUMENTARY: No rashes or lacerations.  · EYES: No exophthalmos, jaundice or blindness.  · ENT: No dizziness, tinnitus or hearing loss.  · RESPIRATORY: No shortness of breath.  · CARDIOVASCULAR: No tachycardia or chest pain.  · GASTROINTESTINAL: No nausea, vomiting, pain, constipation or diarrhea.  · GENITOURINARY: No frequency, dysuria or sexual dysfunction.  · HEMATOLOGIC/LYMPHATIC: No excessive bleeding, prolonged or excessive bleeding after dental extraction/injury.  · ALLERGIC/IMMUNOLOGIC: No allergic response to materials, foods or animals at this time.    Past Medical, Family and Social History: The patient's past medical, family and social history have been reviewed and updated as appropriate within the electronic medical record - see encounter notes.    Compliance: yes    Side effects: see above    Risk Parameters:  Patient reports no suicidal ideation  Patient reports no homicidal ideation  Patient reports no self-injurious behavior  Patient reports no violent behavior    Exam (detailed: at least 9 elements; comprehensive: all 15 elements)   Constitutional  Vitals:  Most recent vital signs, dated greater than 90 days prior to this appointment, were reviewed.   Vitals:    07/12/22 1418   BP: 102/65   Pulse: 85   Weight: 88.1 kg (194 lb 3.6 oz)        General:  unremarkable, age appropriate     Musculoskeletal  Muscle Strength/Tone:  no tremor, no tic   Gait & Station:  non-ataxic      Psychiatric  Speech:  no latency; no press   Mood & Affect:  steady  congruent and appropriate   Thought Process:  normal and logical   Associations:  intact   Thought Content:  normal, no suicidality, no homicidality, delusions, or paranoia   Insight:  intact   Judgement: behavior is adequate to circumstances   Orientation:  grossly intact   Memory: intact for content of interview   Language: grossly intact   Attention Span & Concentration:  able to focus   Fund of Knowledge:  intact and appropriate to age and level of education     Assessment and Diagnosis   Status/Progress: Based on the examination today, the patient's problem(s) is/are well controlled.  New problems have not been presented today.   Co-morbidities and Lack of compliance are not complicating management of the primary condition.  There are no active rule-out diagnoses for this patient at this time.     General Impression:       ICD-10-CM ICD-9-CM   1. Moderate episode of recurrent major depressive disorder  F33.1 296.32   2. Generalized anxiety disorder  F41.1 300.02   3. Panic disorder  F41.0 300.01     Intervention/Counseling/Treatment Plan   · Medication Management: The risks and benefits of medication were discussed with the patient.   · Continue Elavil 300 mg at bedtime  · Continue PRN Xanax 1 mg yovany    Return to Clinic: 6 months    Risks, benefits, side effects and alternative treatments discussed with patient. Patient agrees with the current plan as documented.  Encouraged Patient to keep future appointments.  Take medications as prescribed and abstain from substance abuse.  Pt to present to ED for thoughts to harm himself or others

## 2022-09-02 PROBLEM — I50.42 CHRONIC COMBINED SYSTOLIC AND DIASTOLIC CONGESTIVE HEART FAILURE: Status: ACTIVE | Noted: 2022-01-01

## 2022-09-02 PROBLEM — Z51.5 PALLIATIVE CARE ENCOUNTER: Status: ACTIVE | Noted: 2022-01-01

## 2022-09-02 PROBLEM — I46.9 CARDIAC ARREST: Status: ACTIVE | Noted: 2022-01-01

## 2022-09-02 PROBLEM — J98.8 AIRWAY COMPROMISE: Status: ACTIVE | Noted: 2022-01-01

## 2022-09-02 PROBLEM — E11.9 TYPE 2 DIABETES MELLITUS: Status: ACTIVE | Noted: 2022-01-01

## 2022-09-02 PROBLEM — R57.9 SHOCK CIRCULATORY: Status: ACTIVE | Noted: 2022-01-01

## 2022-09-02 PROBLEM — I42.9 CARDIOMYOPATHY: Status: ACTIVE | Noted: 2022-01-01

## 2022-09-02 PROBLEM — Z71.89 GOALS OF CARE, COUNSELING/DISCUSSION: Status: ACTIVE | Noted: 2022-01-01

## 2022-09-02 NOTE — PLAN OF CARE
Initial assessment completed at bed side with son to discuss how patient will manage her care at the next level.  Roll of CM discussed.  Patient identified by using 2 identifiers:  Name and date of birth.    [facility] [place] - Intensive Care  Initial Discharge Assessment       Primary Care Provider: Skyler Garcia MD    Admission Diagnosis: Cardiac arrest [I46.9]  Encounter for orogastric (OG) tube placement [Z46.59]  PICC (peripherally inserted central catheter) in place [Z45.2]  Acute on chronic combined systolic and diastolic congestive heart failure [I50.43]  Endotracheal tube present [Z97.8]    Admission Date: 9/2/2022  Expected Discharge Date:     Discharge Barriers Identified: None    Payor: HU[employer] MANAGED MEDICARE / Plan: HU[employer] MEDICARE HMO / Product Type: Capitation /     Extended Emergency Contact Information  Primary Emergency Contact: Boogie Blum   [place]  Mobile Phone: [phone]  Relation: Son  Secondary Emergency Contact: Toby Leahy   [facility]  Mobile Phone: [phone]  Relation: Spouse    Discharge Plan A:  (tbd)  Discharge Plan B:  (tbd)      [id number] - [place] [id number]  [address]  [facility] [place] [address]  Phone: [phone] Fax: [phone]      Initial Assessment (most recent)       Adult Discharge Assessment - 09/02/22 1346          Discharge Assessment    Assessment Type Discharge Planning Assessment     Confirmed/corrected address, phone number and insurance Yes     Confirmed Demographics Correct on Facesheet     Source of Information family     If unable to respond/provide information was family/caregiver contacted? Yes     Contact Name/Number Boogie Boateng     When was your last doctors appointment? --   un known    Communicated SUZANNA with patient/caregiver Date not available/Unable to determine     Lives With significant other     Do you expect to  return to your current living situation? Yes     Do you have help at home or someone to help you manage your care at home? Yes     Prior to hospitilization cognitive status: Alert/Oriented     Current cognitive status: Alert/Oriented     Walking or Climbing Stairs Difficulty none     Dressing/Bathing Difficulty none     Home Layout Able to live on 1st floor     Equipment Currently Used at Home none     Readmission within 30 days? No     Patient currently being followed by outpatient case management? --   unknown    Do you currently have service(s) that help you manage your care at home? --   unknown    Do you take prescription medications? Yes     Do you have prescription coverage? Yes     Coverage [employer]     Do you have any problems affording any of your prescribed medications? No     Is the patient taking medications as prescribed? yes     Who is going to help you get home at discharge? Son / SO     How do you get to doctors appointments? car, drives self     Are you on dialysis? No     Do you take coumadin? No     Discharge Plan A --   tbd    Discharge Plan B --   tbd    DME Needed Upon Discharge  --   tbd    Discharge Barriers Identified None        Relationship/Environment    Name(s) of Who Lives With Patient Toby Diaz

## 2022-09-02 NOTE — DISCHARGE SUMMARY
[facility] Medicine  Discharge Summary      Patient Name: Marlena DODGE  MRN: [id number]  Patient Class: IP- Inpatient  Admission Date: 9/2/2022  Hospital Length of Stay: 0 days  Discharge Date and Time:  09/02/2022 2:30 PM  Attending Physician: Bereket Darden MD   Discharging Provider: Bereket Darden MD  Primary Care Provider: Skyler Garcia MD      HPI:   62 y.o. female, with a medical history of Hypertension, CHF (EF 15%), Diabetes, Hyperlipidemia,DM, Obesity and Cardiomyopathy, who presents to the ED via EMS transportation for cardiac arrest. Initial vitals in the ED with a temperature of 96.7 F rectally, blood pressure 67/47, RR 12,Per EMS,  reported she had sudden onset respiratory distress.  EMS reports the patient's  then watched her collapse.  And immediately started CPR.  Per EMS, it is estimated that was approximately 2-3 minutes between collapse and start of CPR.  They report EMS was immediately on scene.  They estimate approximately 20 minutes between initiation of CPR and Rosc.  Prior to Rosc, the patient received 5 epis, 1 amp of bicarb, 300 of amiodarone.  They report that she was in pulseless V-tach or VFib and shocked her 4 times.  They report a junctional rhythm after the 3rd shock.[employer] was called,, who states that the device interrogated and revealed that patient had several episodes of V-tach starting at 4:30 am to 5:30 am. She was shocked twice, once at 20 joules and again at 35 joules. Both defibrillators converted the patient.now per ER family say patient may was longer time unresponsive,patient in ER is intubated,head CT show no acute process,chest X ray show ground glass opacities,patient has been started on IV Abx,she has no gag reflex,fixed pupillae,not responding,cardiology is consulted and patient has been  started on amiodarone gtt and levophed for shock.not able do ROS duo to condition of patient.      * No surgery  found *      Hospital Course:   62 y.o. female, with a medical history of Hypertension, CHF (EF 15%), Diabetes, Hyperlipidemia,DM, Obesity and Cardiomyopathy, who presents to the ED via EMS transportation for cardiac arrest. Initial vitals in the ED with a temperature of 96.7 F rectally, blood pressure 67/47, RR 12,Per EMS,  reported she had sudden onset respiratory distress.  EMS reports the patient's  then watched her collapse.  And immediately started CPR.  Per EMS, it is estimated that was approximately 2-3 minutes between collapse and start of CPR.  They report EMS was immediately on scene.  They estimate approximately 20 minutes between initiation of CPR and Rosc.  Prior to Rosc, the patient received 5 epis, 1 amp of bicarb, 300 of amiodarone.  They report that she was in pulseless V-tach or VFib and shocked her 4 times.  They report a junctional rhythm after the 3rd shock.[employer] was called,, who states that the device interrogated and revealed that patient had several episodes of V-tach starting at 4:30 am to 5:30 am. She was shocked twice, once at 20 joules and again at 35 joules. Both defibrillators converted the patient.now per ER family say patient may was longer time unresponsive,patient in ER is intubated,head CT show no acute process,chest X ray show ground glass opacities,patient has been started on IV Abx,she has no gag reflex,fixed pupillae,not responding,cardiology was consulted and patient has been  started on amiodarone gtt and levophed for shock.not able do ROS duo to condition of patient.palliative care was consulted duo to very poor prognosis,unfortunately shortly after patient was admitted to ICU,patient had another cardiac arrest,patient was started  on ACLS,was shocked multiple times,ROSC was not achieved,patient was pronounced death at 14:09.family was notified.       Goals of Care Treatment Preferences:  Code Status: Full Code      Consults:   Consults (From admission,  onward)        Status Ordering Provider     Inpatient consult to Neurology  Once        Provider:  Mateo Yu MD    Acknowledged SOL BOYLE     Inpatient consult to Pulmonology  Once        Provider:  Cecilia Ferreira MD    Completed SOL BOYLE     Inpatient consult to Spiritual Care  Once        Provider:  (Not yet assigned)    Acknowledged DARRION AL     Inpatient consult to Palliative Care  Once        Provider:  Tarsha Elizabeth NP    Completed DARRION AL     Inpatient consult to Cardiology  Once        Provider:  Frantz Arreola MD    Completed DARRION [place]     [facility]  Once        Provider:  (Not yet assigned)    Completed DARRION [place]          No new Assessment & Plan notes have been filed under this hospital service since the last note was generated.  Service: Hospital Medicine    Final Active Diagnoses:    Diagnosis Date Noted POA    PRINCIPAL PROBLEM:  Cardiac arrest [I46.9] 2022 Yes    Airway compromise [J98.8] 2022 Yes    Shock circulatory [R57.9] 2022 Yes    Chronic combined systolic and diastolic congestive heart failure [I50.42] 2022 Yes    Cardiomyopathy [I42.9] 2022 Yes    Type 2 diabetes mellitus [E11.9] 2022 Yes    Goals of care, counseling/discussion [Z71.89] 2022 Not Applicable    Anxiety and depression [F41.9, F32.A] 2021 Yes    Transaminitis [R74.01] 2015 Yes    LAE (left atrial enlargement) [I51.7] 2015 Yes    MR (mitral regurgitation) [I34.0] 2015 Yes    HTN (hypertension) [I10] 2014 Yes    ICD (implantable cardioverter-defibrillator), dual, in situ [Z95.810] 2014 Yes    Hyperlipidemia [E78.5] 2014 Yes      Problems Resolved During this Admission:       Discharged Condition:     Disposition:     Follow Up:    Patient Instructions:   No discharge procedures on  file.    Significant Diagnostic Studies: Labs:   BMP:   Recent Labs   Lab 09/02/22  0618   *      K 4.2      CO2 19*   BUN 15   CREATININE 1.0   CALCIUM 8.1*   MG 2.0   , CMP   Recent Labs   Lab 09/02/22  0618      K 4.2      CO2 19*   *   BUN 15   CREATININE 1.0   CALCIUM 8.1*   PROT 5.9*   ALBUMIN 2.9*   BILITOT 0.3   ALKPHOS 128   *   *   ANIONGAP 13    and CBC   Recent Labs   Lab 09/02/22  0618   WBC 13.15*   HGB 12.3   HCT 38.0   PLT SEE COMMENT     Radiology: X-Ray: CXR: X-Ray Chest 1 View (CXR):   Results for orders placed or performed during the hospital encounter of 09/02/22   X-Ray Chest 1 View    Narrative    EXAMINATION:  XR CHEST 1 VIEW    CLINICAL HISTORY:  Encounter for adjustment and management of vascular access device    FINDINGS:  Chest one view: Tubes and lines are appropriate.  There is a pacer.  There is cardiomegaly.  There is moderate-severe edema and no change.      Impression    Pulmonary edema pneumonia aspiration or sepsis.      Electronically signed by: Andres Gilliam MD  Date:    09/02/2022  Time:    09:09    and X-Ray Chest PA and Lateral (CXR): No results found for this visit on 09/02/22.  Cardiac Graphics: Echocardiogram:   2D echo with color flow doppler:   Results for orders placed or performed during the hospital encounter of 02/09/15   2D echo with color flow doppler   Result Value Ref Range    EF + QEF 10 (A) 55 - 65    Mitral Valve Regurgitation MILD TO MODERATE     Tricuspid Valve Regurgitation TRIVIAL     Narrative    TEST DESCRIPTION   Technical Quality: This is a technically adequate study.     General: A catheter is present in the right-sided cardiac chambers.     Aorta: The aortic root is normal in size, measuring 2.3 cm at sinotubular junction and 2.8 cm at Sinuses of Valsalva. The proximal ascending aorta is normal in size, measuring 2.9 cm across.     Left Atrium: The left atrial volume index is mildly enlarged,  measuring 37.81 cc/m2.     Left Ventricle: The left ventricle is moderately enlarged, with an end-diastolic diameter of 6.4 cm, and an end-systolic diameter of 5.8 cm. LV wall thickness is normal, with the septum measuring 0.9 cm and the posterior wall measuring 0.8 cm across.   Relative wall thickness was normal at 0.25, and the LV mass index was increased at 130.0 g/m2 consistent with severe eccentric left ventricular hypertrophy. Global left ventricular systolic function appears severely depressed. Visually estimated ejection   fraction is 10-15%. The LV Doppler derived stroke volume equals 40.0 ccs.   Mitral inflow pattern reveals fusion of E & A waves. Therefore, no comment on diastolic function can be made.     Right Atrium: The right atrium is normal in size, measuring 3.2 cm in length and 3.3 cm in width in the apical view.     Right Ventricle: The right ventricle is normal in size measuring 2.8 cm at the base in the apical right ventricle-focused view. Global right ventricular systolic function appears mildly depressed. Tricuspid annular plane systolic excursion (TAPSE) is 1.9   cm.     Aortic Valve:  The aortic valve is normal in structure.     Mitral Valve:  The mitral valve is normal in structure. There is mild to moderate mitral regurgitation.     Tricuspid Valve:  The tricuspid valve is normal in structure. There is trivial tricuspid regurgitation.     Pulmonary Valve:  The pulmonic valve is normal in structure.     Intracavitary: There is no evidence of pericardial effusion, intracavity mass, thrombi, or vegetation.         CONCLUSIONS     1 - Eccentric LCH with severely depressed left ventricular systolic function (EF 10-15%).     2 - Mild to moderate mitral regurgitation.     3 - There is pulsus on LVOT Doppler..     4 - Left atrial enlargement.         This document has been electronically    SIGNED BY: Krissy Silva MD On: 02/09/2015 11:11    This document was originally electronically signed on:  02/09/2015 11:10    and Transthoracic echo (TTE) complete (Cupid Only):   Results for orders placed or performed during the hospital encounter of 09/02/22   Echo   Result Value Ref Range    STJ 2.19 cm    AV mean gradient 3 mmHg    Ao peak regan 1.20 m/s    Ao VTI 19.36 cm    IVS 1.21 (A) 0.6 - 1.1 cm    LA size 4.96 cm    Left Atrium Major Axis 5.35 cm    Left Atrium Minor Axis 5.85 cm    LVIDd 6.01 (A) 3.5 - 6.0 cm    LVIDs 5.73 (A) 2.1 - 4.0 cm    LVOT diameter 1.78 cm    LVOT peak VTI 11.07 cm    Posterior Wall 1.01 0.6 - 1.1 cm    RA Major Axis 3.86 cm    RA Width 2.89 cm    TR Max Regan 2.43 m/s    LA WIDTH 5.21 cm    LV Diastolic Volume 180.56 mL    LV Systolic Volume 161.96 mL    LVOT peak regan 0.69 m/s    Mr max regan 0.05 m/s    FS 5 %    LA volume 122.76 cm3    LV mass 283.78 g    Left Ventricle Relative Wall Thickness 0.34 cm    AV valve area 1.42 cm2    AV Velocity Ratio 0.58     AV index (prosthetic) 0.57     LVOT area 2.5 cm2    LVOT stroke volume 27.53 cm3    AV peak gradient 6 mmHg    LV Systolic Volume Index 83.5 mL/m2    LV Diastolic Volume Index 93.07 mL/m2    LA Volume Index 63.3 mL/m2    LV Mass Index 146 g/m2    Triscuspid Valve Regurgitation Peak Gradient 24 mmHg    BSA 1.99 m2    EF 25 %    Narrative    · The left ventricle is mildly enlarged with mild eccentric hypertrophy   and severely decreased systolic function.  · The estimated ejection fraction is 25%.  · Severe global hypokinesis with anteroseptal dyskinesis.  · Left ventricular diastolic dysfunction.  · Normal right ventricular size with normal right ventricular systolic   function.  · Moderate mitral regurgitation.  · There is no evidence of intracardiac shunting (negative saline contrast   study).          Pending Diagnostic Studies:     None         Medications:  Reconciled Home Medications:      Medication List      ASK your doctor about these medications    ALPRAZolam 1 MG tablet  Commonly known as: XANAX  Take 1 tablet (1 mg total)  by mouth daily as needed for Anxiety. May take 1/2 pill as needed as well     amitriptyline 100 MG tablet  Commonly known as: ELAVIL  Take 3 tablets (300 mg total) by mouth every evening.     atorvastatin 20 MG tablet  Commonly known as: LIPITOR  Take 20 mg by mouth once daily.     carvediloL 6.25 MG tablet  Commonly known as: COREG  TAKE 2 TABLET BY MOUTH EVERY MORNING AND EVERY EVENING. TAKE MORNING DOSE RIGHT BEFORE LISINOPRIL     cholecalciferol (vitamin D3) 100 mcg (4,000 unit) Cap capsule  Commonly known as: VITAMIN D3  Take 4,000 mg by mouth once daily.     ENTRESTO ORAL  Take by mouth.     famotidine 20 MG tablet  Commonly known as: PEPCID  Take 1 tablet (20 mg total) by mouth 2 (two) times daily.     furosemide 40 MG tablet  Commonly known as: LASIX  Take 1 tablet (40 mg total) by mouth once daily.     HYDROcodone-acetaminophen 5-325 mg per tablet  Commonly known as: NORCO  Take 1 tablet by mouth every 8 (eight) hours as needed for Pain.     latanoprost 0.005 % ophthalmic solution     lisinopriL 40 MG tablet  Commonly known as: PRINIVIL,ZESTRIL  Take 1 tablet (40 mg total) by mouth once daily.     METANX ORAL  Take by mouth.     metFORMIN 500 MG tablet  Commonly known as: GLUCOPHAGE  TAKE 2 TABLETS BY MOUTH TWICE DAILY     omega-3 acid ethyl esters 1 gram capsule  Commonly known as: LOVAZA  TAKE 1 CAPSULE BY MOUTH TWICE DAILY     ondansetron 4 MG tablet  Commonly known as: ZOFRAN  Take 1 tablet (4 mg total) by mouth every 6 (six) hours as needed.     perphenazine-amitriptyline 4-50 mg Tab  Take 2 tablets by mouth at bedtime     potassium chloride 10 MEQ Tbsr  Commonly known as: KLOR-CON  Take 2 tablets (20 mEq total) by mouth once daily.     pravastatin 40 MG tablet  Commonly known as: PRAVACHOL  Take 1 tablet (40 mg total) by mouth once daily.            Indwelling Lines/Drains at time of discharge:   Lines/Drains/Airways     Peripherally Inserted Central Catheter Line  Duration           PICC Triple Lumen  09/02/22 0855 right basilic <1 day          Drain  Duration                NG/OG Tube 09/02/22 1000 Left mouth <1 day         Urethral Catheter 09/02/22 0602 16 Fr. <1 day          Airway  Duration                Airway - Non-Surgical 09/02/22 <1 day                Time spent on the discharge of patient: over 45  minutes    Critical care time spent on the evaluation and treatment of severe organ dysfunction, review of pertinent labs and imaging studies, discussions with consulting providers and discussions with patient/family: over 45  minutes.     Bereket Darden MD  Department of Hospital Medicine  [facility] [place] - Intensive Care

## 2022-09-02 NOTE — HPI
HPI: Per chart review 62 y.o. female, with a medical history of Hypertension, CHF (EF 15%), Diabetes, Hyperlipidemia,DM, Obesity and Cardiomyopathy, who presents to the ED via EMS transportation for cardiac arrest. Initial vitals in the ED with a temperature of 96.7 F rectally, blood pressure 67/47, RR 12,Per EMS,  reported she had sudden onset respiratory distress.  EMS reports the patient's  then watched her collapse.  And immediately started CPR.  Per EMS, it is estimated that was approximately 2-3 minutes between collapse and start of CPR.  They report EMS was immediately on scene.  They estimate approximately 20 minutes between initiation of CPR and Rosc.  Prior to Rosc, the patient received 5 epis, 1 amp of bicarb, 300 of amiodarone.  They report that she was in pulseless V-tach or VFib and shocked her 4 times.  They report a junctional rhythm after the 3rd shock.[employer] was called,, who states that the device interrogated and revealed that patient had several episodes of V-tach starting at 4:30 am to 5:30 am. She was shocked twice, once at 20 joules and again at 35 joules. Both defibrillators converted the patient.now per ER family say patient may was longer time unresponsive,patient in ER is intubated,head CT show no acute process,chest X ray show ground glass opacities,patient has been started on IV Abx,she has no gag reflex,fixed pupillae,not responding,cardiology is consulted and patient has been started on amiodarone gtt and levophed for shock.

## 2022-09-02 NOTE — SUBJECTIVE & OBJECTIVE
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%    Diastolic dysfunction with heart failure 2015    Echo     HTN (hypertension)     Hx of psychiatric care     Hyperglycemia     Hyperlipidemia     ICD (implantable cardioverter-defibrillator), dual, in situ     Medtronic     LAE (left atrial enlargement) 2015    MR (mitral regurgitation) 2015    mod    Panic disorder     Psychiatric problem     Therapy        Past Surgical History:   Procedure Laterality Date    CARDIAC DEFIBRILLATOR PLACEMENT       SECTION         Review of patient's allergies indicates:   Allergen Reactions    Aspirin Nausea And Vomiting    Codeine Nausea And Vomiting       No current facility-administered medications on file prior to encounter.     Current Outpatient Medications on File Prior to Encounter   Medication Sig    ALPRAZolam (XANAX) 1 MG tablet Take 1 tablet (1 mg total) by mouth daily as needed for Anxiety. May take 1/2 pill as needed as well    amitriptyline (ELAVIL) 100 MG tablet Take 3 tablets (300 mg total) by mouth every evening.    atorvastatin (LIPITOR) 20 MG tablet Take 20 mg by mouth once daily.    carvedilol (COREG) 6.25 MG tablet TAKE 2 TABLET BY MOUTH EVERY MORNING AND EVERY EVENING. TAKE MORNING DOSE RIGHT BEFORE LISINOPRIL    cholecalciferol, vitamin D3, (VITAMIN D3) 4,000 unit Cap Take 4,000 mg by mouth once daily.    famotidine (PEPCID) 20 MG tablet Take 1 tablet (20 mg total) by mouth 2 (two) times daily.    furosemide (LASIX) 40 MG tablet Take 1 tablet (40 mg total) by mouth once daily.    hydrocodone-acetaminophen 5-325mg (NORCO) 5-325 mg per tablet Take 1 tablet by mouth every 8 (eight) hours as needed for Pain.    latanoprost 0.005 % ophthalmic solution     lisinopril (PRINIVIL,ZESTRIL) 40 MG tablet Take 1 tablet (40 mg total) by  mouth once daily.    mecobal/levomefolat Ca/B6 phos (METANX ORAL) Take by mouth.    metformin (GLUCOPHAGE) 500 MG tablet TAKE 2 TABLETS BY MOUTH TWICE DAILY    omega-3 acid ethyl esters (LOVAZA) 1 gram capsule TAKE 1 CAPSULE BY MOUTH TWICE DAILY    ondansetron (ZOFRAN) 4 MG tablet Take 1 tablet (4 mg total) by mouth every 6 (six) hours as needed.    perphenazine-amitriptyline 4-50 mg Tab Take 2 tablets by mouth at bedtime    potassium chloride (KLOR-CON) 10 MEQ TbSR Take 2 tablets (20 mEq total) by mouth once daily.    pravastatin (PRAVACHOL) 40 MG tablet Take 1 tablet (40 mg total) by mouth once daily.    SACUBITRIL/VALSARTAN (ENTRESTO ORAL) Take by mouth.     Family History       Problem Relation (Age of Onset)    Cancer Maternal Aunt, Paternal Aunt    Depression Sister    Early death Mother    Hypertension Mother, Father    Stroke Father          Tobacco Use    Smoking status: Never    Smokeless tobacco: Never   Substance and Sexual Activity    Alcohol use: No    Drug use: No    Sexual activity: Not on file     Review of Systems,not able be  done.  Objective:     Vital Signs (Most Recent):  Temp: (!) 95.5 °F (35.3 °C) (09/02/22 0738)  Pulse: 105 (09/02/22 0738)  Resp: (!) 0 (09/02/22 0732)  BP: (!) 148/86 (09/02/22 0733)  SpO2: (!) 90 % (09/02/22 0738)   Vital Signs (24h Range):  Temp:  [94.3 °F (34.6 °C)-96.7 °F (35.9 °C)] 95.5 °F (35.3 °C)  Pulse:  [] 105  Resp:  [0-20] 0  SpO2:  [90 %-100 %] 90 %  BP: ()/(47-97) 148/86     Weight: 88.5 kg (195 lb)  Body mass index is 33.47 kg/m².    Physical Exam  Constitutional:       Appearance: She is ill-appearing.   HENT:      Head: Atraumatic.      Nose: Nose normal.   Eyes:      Extraocular Movements: Extraocular movements intact.   Cardiovascular:      Rate and Rhythm: Normal rate and regular rhythm.   Pulmonary:      Effort: Respiratory distress present.      Breath sounds: Normal breath sounds.   Abdominal:      General: There is no distension.       Palpations: Abdomen is soft.      Tenderness: There is no abdominal tenderness.   Musculoskeletal:         General: No swelling or deformity. Normal range of motion.      Cervical back: Normal range of motion.   Skin:     General: Skin is warm and dry.   Neurological:      Comments: Not responsive fixed pupils,           Significant Labs: All pertinent labs within the past 24 hours have been reviewed.  ABGs:   Recent Labs   Lab 09/02/22 0631 09/02/22  0824   PH 7.186* 7.335*   PCO2 62.9* 37.1   HCO3 23.8* 19.8*   POCSATURATED 50* 88*   BE -6 -5   PO2 34* 57*     BMP:   Recent Labs   Lab 09/02/22  0618   *      K 4.2      CO2 19*   BUN 15   CREATININE 1.0   CALCIUM 8.1*   MG 2.0     CBC:   Recent Labs   Lab 09/02/22 0618   WBC 13.15*   HGB 12.3   HCT 38.0   PLT SEE COMMENT     CMP:   Recent Labs   Lab 09/02/22 0618      K 4.2      CO2 19*   *   BUN 15   CREATININE 1.0   CALCIUM 8.1*   PROT 5.9*   ALBUMIN 2.9*   BILITOT 0.3   ALKPHOS 128   *   *   ANIONGAP 13     Troponin:   Recent Labs   Lab 09/02/22 0618   TROPONINI 0.132*       Significant Imaging: I have reviewed all pertinent imaging results/findings within the past 24 hours.

## 2022-09-02 NOTE — ASSESSMENT & PLAN NOTE
-Patient sees psych outpatient  -Prescribed Xanax and opiates which have increased recently after the death of her mother and brother 3 months apart earlier this year  -tox screen + for prescribed meds only

## 2022-09-02 NOTE — SUBJECTIVE & OBJECTIVE
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%    Diastolic dysfunction with heart failure 2015    Echo     HTN (hypertension)     Hx of psychiatric care     Hyperglycemia     Hyperlipidemia     ICD (implantable cardioverter-defibrillator), dual, in situ     [employer]tronic     LAE (left atrial enlargement) 2015    MR (mitral regurgitation) 2015    mod    Panic disorder     Psychiatric problem     Therapy     Type 2 diabetes mellitus 2022       Past Surgical History:   Procedure Laterality Date    CARDIAC DEFIBRILLATOR PLACEMENT       SECTION         Review of patient's allergies indicates:   Allergen Reactions    Aspirin Nausea And Vomiting    Codeine Nausea And Vomiting       Medications:  Continuous Infusions:   sodium chloride 0.9%      amiodarone in dextrose 5% 1 mg/min (22 0758)    amiodarone in dextrose 5%      NORepinephrine bitartrate-D5W 1 mcg/kg/min (22 0818)     Scheduled Meds:   lactated ringers  1,000 mL Intravenous ED 1 Time    piperacillin-tazobactam (ZOSYN) IVPB  4.5 g Intravenous Q8H    vancomycin (VANCOCIN) IVPB  2,000 mg Intravenous Once     PRN Meds:dextrose 10%, dextrose 10%, dextrose 50%, glucagon (human recombinant), insulin aspart U-100    Family History       Problem Relation (Age of Onset)    Cancer Maternal Aunt, Paternal Aunt    Depression Sister    Early death Mother    Hypertension Mother, Father    Stroke Father          Tobacco Use    Smoking status: Never    Smokeless tobacco: Never   Substance and Sexual Activity    Alcohol use: No    Drug use: No    Sexual activity: Not on file       Review of Systems   Unable to perform ROS: Intubated   Objective:     Vital Signs (Most Recent):  Temp: (!) 95 °F (35 °C) (22)  Pulse: 91 (22)  Resp: (!) 22 (22 08)  BP:  136/84 (09/02/22 0847)  SpO2: (!) 88 % (09/02/22 0847)   Vital Signs (24h Range):  Temp:  [94.3 °F (34.6 °C)-96.7 °F (35.9 °C)] 95 °F (35 °C)  Pulse:  [] 91  Resp:  [0-36] 22  SpO2:  [87 %-100 %] 88 %  BP: ()/(47-97) 136/84     Weight: 88.5 kg (195 lb)  Body mass index is 33.47 kg/m².    Physical Exam  Vitals and nursing note reviewed.   Constitutional:       General: She is not in acute distress.     Appearance: She is ill-appearing. She is not toxic-appearing or diaphoretic.   HENT:      Mouth/Throat:      Comments: ETT in place  Eyes:      Comments: Pupils nonreactive    Cardiovascular:      Rate and Rhythm: Normal rate and regular rhythm.      Comments: AICD  Pulmonary:      Comments: intubated  Abdominal:      General: There is distension.      Palpations: Abdomen is soft.   Musculoskeletal:      Right lower leg: No edema.      Left lower leg: No edema.   Skin:     General: Skin is warm and dry.   Neurological:      Mental Status: She is unresponsive.      GCS: GCS eye subscore is 1. GCS verbal subscore is 1. GCS motor subscore is 1.      Comments: Intubated, non sedation, pupils nonreactive,    Psychiatric:      Comments: Unable to assess. Patient unresponsive, intubated, no sedation         Advance Care Planning   Advance Care Planning       Significant Labs: All pertinent labs within the past 24 hours have been reviewed.  CBC:   Recent Labs   Lab 09/02/22 0618   WBC 13.15*   HGB 12.3   HCT 38.0   MCV 94   PLT SEE COMMENT     BMP:  Recent Labs   Lab 09/02/22 0618   *      K 4.2      CO2 19*   BUN 15   CREATININE 1.0   CALCIUM 8.1*   MG 2.0     LFT:  Lab Results   Component Value Date     (H) 09/02/2022    ALKPHOS 128 09/02/2022    BILITOT 0.3 09/02/2022     Albumin:   Albumin   Date Value Ref Range Status   09/02/2022 2.9 (L) 3.5 - 5.2 g/dL Final     Protein:   Total Protein   Date Value Ref Range Status   09/02/2022 5.9 (L) 6.0 - 8.4 g/dL Final     Lactic acid:   Lab  Results   Component Value Date    LACTATE 3.3 (H) 09/02/2022       Significant Imaging: I have reviewed all pertinent imaging results/findings within the past 24 hours. CT head, CXR

## 2022-09-02 NOTE — PROCEDURES
"Marlena DODGE is a 62 y.o. female patient.    Temp: (!) 95 °F (35 °C) (09/02/22 0847)  Pulse: 91 (09/02/22 0847)  Resp: (!) 22 (09/02/22 0851)  BP: 136/84 (09/02/22 0847)  SpO2: (!) 88 % (09/02/22 0847)  Weight: 88.5 kg (195 lb) (09/02/22 0606)  Height: 5' 4" (162.6 cm) (09/02/22 0602)    PICC  Date/Time: 9/2/2022 8:55 AM  Performed by: Kalyan Zacarias RN  Consent Done: Emergent Situation  Time out: Immediately prior to procedure a time out was called to verify the correct patient, procedure, equipment, support staff and site/side marked as required  Indications: med administration  Anesthesia: local infiltration  Local anesthetic: lidocaine 1% without epinephrine  Anesthetic Total (mL): 1  Preparation: skin prepped with ChloraPrep  Skin prep agent dried: skin prep agent completely dried prior to procedure  Sterile barriers: all five maximum sterile barriers used - cap, mask, sterile gown, sterile gloves, and large sterile sheet  Hand hygiene: hand hygiene performed prior to central venous catheter insertion  Location details: right basilic  Catheter type: triple lumen  Catheter size: 5 Fr  Catheter Length: 37cm    Ultrasound guidance: yes  Vessel Caliber: medium and large and patent, compressibility normal  Needle advanced into vessel with real time Ultrasound guidance.  Guidewire confirmed in vessel.  Sterile sheath used.  Number of attempts: 1  Post-procedure: blood return through all ports, chlorhexidine patch and sterile dressing applied  Estimated blood loss (mL): 0          Name Kalyan Zacarias   9/2/2022    "

## 2022-09-02 NOTE — CONSULTS
[facility] - Intensive Care  Palliative Medicine  Consult Note    Patient Name: Marlena DODGE  MRN: [id number]  Admission Date: 9/2/2022  Hospital Length of Stay: 0 days  Code Status: Prior   Attending Provider: Bereket Darden MD  Consulting Provider: Tarsha Elizabeth NP  Primary Care Physician: Skyler Garcia MD  Principal Problem:Cardiac arrest    Patient information was obtained from past medical records, ER records, and primary team.      Inpatient consult to Palliative Care  Consult performed by: Tarsha Elizabeth NP  Consult ordered by: Génesis Carbone DO  Reason for consult: support/GOC      Assessment/Plan:   Palliative encounter:    -Palliative consult received for patient with longstanding cardiac hx who came in with cardiac arrest.   -Chart reviewed in detail  -At time of consult patient still in ED. She is currently intubated, on levo, unresponvie without sedation, no gag, no purposeful movement, pupils nonreactive   -no family present. Spouse had left but is to return soon per ED nurse  -patient transferred to ICU; At this time it is very early in care.   -discussed with Pulmonary Dr Roxanne Ferreira  And Cardiology Dr Jara  -patient's spouse not answering phone; patient's son Boogie and his spouse Priscilla who is a nurse (ICU in NP school) arrived.   -Together with Dr Ferreira Pul/CC update given. The son Boogie was having a difficult time and Priscilla was helpful in comforting him and helping him understand the gravity of the situation.   Priscilla does report that patient's spouse had a stroke and at time gets things confused which may have contributed to 2 different stories about hours vs mins before CPR started.   -We did discuss code status and should her heart stop again the expected outcome would be even worse and not reccommended and once Toby is present they need to discuss amongst themselves what they feel the patient would want in this circumstance. Other than this the most accurate  "prognosis would come in 48-72 hours to give the patient time to see if any improvement.   -It was relayed to family the medical team is concerned the outcome will likely be poor and Priscilla MEET states " yes like anoxic brain injury".   -Will continue to provide support to family  - consulted; discussed case with           * Cardiac arrest  -Patient down for unknown amount of time; CPR initiated by spouse, EMS arrived Prior to Rosc, the patient received 5 epis, 1 amp of bicarb, 300 of amiodarone. They report that she was in pulseless V-tach or VFib and shocked her 4 times.  They report a junctional rhythm after the 3rd shock.[employer] was called,, who states that the device interrogated and revealed that patient had several episodes of V-tach starting at 4:30 am to 5:30 am. She was shocked twice, once at 20 joules and again at 35 joules.  -CARDS consulted    Type 2 diabetes mellitus  -noted  -Mgmt per primary    Airway compromise  -Intubated  -Pulmonary consulted    Cardiomyopathy  -Followed outpatient by  cardiology    Chronic combined systolic and diastolic congestive heart failure  -Followed by  cardiology outpatient  -EF15 % w/ AICD    Anxiety and depression  -Patient sees psych outpatient  -Prescribed Xanax and opiates which have increased recently after the death of her mother and brother 3 months apart earlier this year  -tox screen + for prescribed meds only      MR (mitral regurgitation)  -Per echo    LAE (left atrial enlargement)  -per echo    Hyperlipidemia  -On statin    HTN (hypertension)  -On levophed due to hypotension  -All antihypertensives on hold    ICD (implantable cardioverter-defibrillator), dual, in situ  -Medtronic since 2013  -Fired several times per interrogation  -monitored        Thank you for your consult.  Palliative will f/u. I will be out for 2 weeks but one of my colleagues will see pt/family    Subjective:       HPI: Per chart review 62 y.o. female, with a medical " history of Hypertension, CHF (EF 15%), Diabetes, Hyperlipidemia,DM, Obesity and Cardiomyopathy, who presents to the ED via EMS transportation for cardiac arrest. Initial vitals in the ED with a temperature of 96.7 F rectally, blood pressure 67/47, RR 12,Per EMS,  reported she had sudden onset respiratory distress.  EMS reports the patient's  then watched her collapse.  And immediately started CPR.  Per EMS, it is estimated that was approximately 2-3 minutes between collapse and start of CPR.  They report EMS was immediately on scene.  They estimate approximately 20 minutes between initiation of CPR and Rosc.  Prior to Rosc, the patient received 5 epis, 1 amp of bicarb, 300 of amiodarone.  They report that she was in pulseless V-tach or VFib and shocked her 4 times.  They report a junctional rhythm after the 3rd shock.[employer] was called,, who states that the device interrogated and revealed that patient had several episodes of V-tach starting at 4:30 am to 5:30 am. She was shocked twice, once at 20 joules and again at 35 joules. Both defibrillators converted the patient.now per ER family say patient may was longer time unresponsive,patient in ER is intubated,head CT show no acute process,chest X ray show ground glass opacities,patient has been started on IV Abx,she has no gag reflex,fixed pupillae,not responding,cardiology is consulted and patient has been started on amiodarone gtt and levophed for shock.       Hospital Course:  No notes on file      Past Medical History:   Diagnosis Date    Anxiety     Anxiety and depression 9/17/2021    Congestive heart failure with left ventricular systolic dysfunction     Tosha-partem cardiomyopathy Dx 1992 -EF 10-15%    Diastolic dysfunction with heart failure 4/30/2015    Echo 5/14    HTN (hypertension)     Hx of psychiatric care     Hyperglycemia     Hyperlipidemia     ICD (implantable cardioverter-defibrillator), dual, in situ     [employer]     LAE (left  atrial enlargement) 2015    MR (mitral regurgitation) 2015    mod    Panic disorder     Psychiatric problem     Therapy     Type 2 diabetes mellitus 2022       Past Surgical History:   Procedure Laterality Date    CARDIAC DEFIBRILLATOR PLACEMENT       SECTION         Review of patient's allergies indicates:   Allergen Reactions    Aspirin Nausea And Vomiting    Codeine Nausea And Vomiting       Medications:  Continuous Infusions:   sodium chloride 0.9%      amiodarone in dextrose 5% 1 mg/min (22 0758)    amiodarone in dextrose 5%      NORepinephrine bitartrate-D5W 1 mcg/kg/min (22 0818)     Scheduled Meds:   lactated ringers  1,000 mL Intravenous ED 1 Time    piperacillin-tazobactam (ZOSYN) IVPB  4.5 g Intravenous Q8H    vancomycin (VANCOCIN) IVPB  2,000 mg Intravenous Once     PRN Meds:dextrose 10%, dextrose 10%, dextrose 50%, glucagon (human recombinant), insulin aspart U-100    Family History       Problem Relation (Age of Onset)    Cancer Maternal Aunt, Paternal Aunt    Depression Sister    Early death Mother    Hypertension Mother, Father    Stroke Father          Tobacco Use    Smoking status: Never    Smokeless tobacco: Never   Substance and Sexual Activity    Alcohol use: No    Drug use: No    Sexual activity: Not on file       Review of Systems   Unable to perform ROS: Intubated   Objective:     Vital Signs (Most Recent):  Temp: (!) 95 °F (35 °C) (22)  Pulse: 91 (22)  Resp: (!) 22 (22 08)  BP: 136/84 (22)  SpO2: (!) 88 % (22)   Vital Signs (24h Range):  Temp:  [94.3 °F (34.6 °C)-96.7 °F (35.9 °C)] 95 °F (35 °C)  Pulse:  [] 91  Resp:  [0-36] 22  SpO2:  [87 %-100 %] 88 %  BP: ()/(47-97) 136/84     Weight: 88.5 kg (195 lb)  Body mass index is 33.47 kg/m².    Physical Exam  Vitals and nursing note reviewed.   Constitutional:       General: She is not in acute distress.     Appearance: She is ill-appearing. She is  not toxic-appearing or diaphoretic.   HENT:      Mouth/Throat:      Comments: ETT in place  Eyes:      Comments: Pupils nonreactive    Cardiovascular:      Rate and Rhythm: Normal rate and regular rhythm.      Comments: AICD  Pulmonary:      Comments: intubated  Abdominal:      General: There is distension.      Palpations: Abdomen is soft.   Musculoskeletal:      Right lower leg: No edema.      Left lower leg: No edema.   Skin:     General: Skin is warm and dry.   Neurological:      Mental Status: She is unresponsive.      GCS: GCS eye subscore is 1. GCS verbal subscore is 1. GCS motor subscore is 1.      Comments: Intubated, non sedation, pupils nonreactive,    Psychiatric:      Comments: Unable to assess. Patient unresponsive, intubated, no sedation         Advance Care Planning   Advance Care Planning       Significant Labs: All pertinent labs within the past 24 hours have been reviewed.  CBC:   Recent Labs   Lab 09/02/22  0618   WBC 13.15*   HGB 12.3   HCT 38.0   MCV 94   PLT SEE COMMENT     BMP:  Recent Labs   Lab 09/02/22 0618   *      K 4.2      CO2 19*   BUN 15   CREATININE 1.0   CALCIUM 8.1*   MG 2.0     LFT:  Lab Results   Component Value Date     (H) 09/02/2022    ALKPHOS 128 09/02/2022    BILITOT 0.3 09/02/2022     Albumin:   Albumin   Date Value Ref Range Status   09/02/2022 2.9 (L) 3.5 - 5.2 g/dL Final     Protein:   Total Protein   Date Value Ref Range Status   09/02/2022 5.9 (L) 6.0 - 8.4 g/dL Final     Lactic acid:   Lab Results   Component Value Date    LACTATE 3.3 (H) 09/02/2022       Significant Imaging: I have reviewed all pertinent imaging results/findings within the past 24 hours. CT head, CXR         16 min ACP time spent in goals of care, emotional support, formulating and communicating prognosis, exploring burden/benefit of various approaches of treatment.      50% of 70 mins spent in chart review, face to face discussion, symptom assessment, coordination of care  with other specialists and d/c planning.    Total time 86 mins    Tarsha Elizabeth, NP  Palliative Medicine  [facility] - Intensive Care

## 2022-09-02 NOTE — HPI
62 y.o. female, with a medical history of Hypertension, CHF (EF 15%), Diabetes, Hyperlipidemia,DM, Obesity and Cardiomyopathy, who presents to the ED via EMS transportation for cardiac arrest. Initial vitals in the ED with a temperature of 96.7 F rectally, blood pressure 67/47, RR 12,Per EMS,  reported she had sudden onset respiratory distress.  EMS reports the patient's  then watched her collapse.  And immediately started CPR.  Per EMS, it is estimated that was approximately 2-3 minutes between collapse and start of CPR.  They report EMS was immediately on scene.  They estimate approximately 20 minutes between initiation of CPR and Rosc.  Prior to Rosc, the patient received 5 epis, 1 amp of bicarb, 300 of amiodarone.  They report that she was in pulseless V-tach or VFib and shocked her 4 times.  They report a junctional rhythm after the 3rd shock.[employer] was called,, who states that the device interrogated and revealed that patient had several episodes of V-tach starting at 4:30 am to 5:30 am. She was shocked twice, once at 20 joules and again at 35 joules. Both defibrillators converted the patient.now per ER family say patient may was longer time unresponsive,patient in ER is intubated,head CT show no acute process,chest X ray show ground glass opacities,patient has been started on IV Abx,she has no gag reflex,fixed pupillae,not responding,cardiology is consulted and patient has been  started on amiodarone gtt and levophed for shock.    Cardiologist: Tiffanie ([facility])  EP: Kristin ([facility])    Cardiology consulted for cardiac arrest.    Patient is seen in the intensive care unit, case discussed with critical care team.  She is an unfortunate 60-year-old woman with a history of peripartum cardiomyopathy.  She has a single-chamber Medtronic ICD in place.  Earlier this week, she was seen by her cardiology providers for an ICD shock which initially was thought to be ventricular tachycardia.  She was  Message left for patient to call back so surgery instructions can be given. subsequently seen by her electrophysiologist, with the thought that this could have been an inappropriate shock for SVT with aberrancy.  She was planned to have an EP study later on next week.  The patient's history today is not entirely clear.  Per the history above, the patient's  witnessed the patient's cardiac arrest and immediately began CPR.  However, further history is been obtained that suggests that the patient's  did not witness the arrest.  This piece of information is very important as her CAHPS score would be low risk if he witnessed the arrest and initiated CPR immediately, as opposed to high score if the patient's arrest was unwitnessed.  Nevertheless, the patient received multiple rounds of CPR and epinephrine with eventual ROSC after 20 minutes of EMS treatment.  She is now on amiodarone drip and norepinephrine.  She is somewhat hypothermic.  She is breathing over the vent but is otherwise unresponsive.  Her Medtronic defibrillator was interrogated noting ventricular tachycardia for which she received 2 cardioversions.

## 2022-09-02 NOTE — CONSULTS
[place] - Intensive Care  Cardiology  Consult Note    Patient Name: Marlena DODGE  MRN: [id number]  Admission Date: 9/2/2022  Hospital Length of Stay: 0 days  Code Status: Prior   Attending Provider: Bereket Darden MD   Consulting Provider: Pedro Jara MD  Primary Care Physician: Sykler Garcia MD  Principal Problem:Cardiac arrest    Patient information was obtained from ER records.     Inpatient consult to Cardiology  Consult performed by: Pedro Jara MD  Consult ordered by: Génesis Carbone DO  Reason for consult: Card Arrest        Subjective:     Chief Complaint:  LOC     HPI:   62 y.o. female, with a medical history of Hypertension, CHF (EF 15%), Diabetes, Hyperlipidemia,DM, Obesity and Cardiomyopathy, who presents to the ED via EMS transportation for cardiac arrest. Initial vitals in the ED with a temperature of 96.7 F rectally, blood pressure 67/47, RR 12,Per EMS,  reported she had sudden onset respiratory distress.  EMS reports the patient's  then watched her collapse.  And immediately started CPR.  Per EMS, it is estimated that was approximately 2-3 minutes between collapse and start of CPR.  They report EMS was immediately on scene.  They estimate approximately 20 minutes between initiation of CPR and Rosc.  Prior to Rosc, the patient received 5 epis, 1 amp of bicarb, 300 of amiodarone.  They report that she was in pulseless V-tach or VFib and shocked her 4 times.  They report a junctional rhythm after the 3rd shock.[employer] was called,, who states that the device interrogated and revealed that patient had several episodes of V-tach starting at 4:30 am to 5:30 am. She was shocked twice, once at 20 joules and again at 35 joules. Both defibrillators converted the patient.now per ER family say patient may was longer time unresponsive,patient in ER is intubated,head CT show no acute process,chest X ray show ground glass opacities,patient has been started  on IV Abx,she has no gag reflex,fixed pupillae,not responding,cardiology is consulted and patient has been  started on amiodarone gtt and levophed for shock.    Cardiologist: Tiffanie ([facility])  EP: Kristin ([facility])    Cardiology consulted for cardiac arrest.    Patient is seen in the intensive care unit, case discussed with critical care team.  She is an unfortunate 60-year-old woman with a history of peripartum cardiomyopathy.  She has a single-chamber Medtronic ICD in place.  Earlier this week, she was seen by her cardiology providers for an ICD shock which initially was thought to be ventricular tachycardia.  She was subsequently seen by her electrophysiologist, with the thought that this could have been an inappropriate shock for SVT with aberrancy.  She was planned to have an EP study later on next week.  The patient's history today is not entirely clear.  Per the history above, the patient's  witnessed the patient's cardiac arrest and immediately began CPR.  However, further history is been obtained that suggests that the patient's  did not witness the arrest.  This piece of information is very important as her CAHPS score would be low risk if he witnessed the arrest and initiated CPR immediately, as opposed to high score if the patient's arrest was unwitnessed.  Nevertheless, the patient received multiple rounds of CPR and epinephrine with eventual ROSC after 20 minutes of EMS treatment.  She is now on amiodarone drip and norepinephrine.  She is somewhat hypothermic.  She is breathing over the vent but is otherwise unresponsive.  Her Medtronic defibrillator was interrogated noting ventricular tachycardia for which she received 2 cardioversions.          Past Medical History:   Diagnosis Date    Anxiety     Anxiety and depression 9/17/2021    Congestive heart failure with left ventricular systolic dysfunction     Tosha-partem cardiomyopathy Dx 1992 -EF 10-15%    Diastolic dysfunction with heart  failure 2015    Echo     HTN (hypertension)     Hx of psychiatric care     Hyperglycemia     Hyperlipidemia     ICD (implantable cardioverter-defibrillator), dual, in situ     Medtronic     LAE (left atrial enlargement) 2015    MR (mitral regurgitation) 2015    mod    Panic disorder     Psychiatric problem     Therapy     Type 2 diabetes mellitus 2022       Past Surgical History:   Procedure Laterality Date    CARDIAC DEFIBRILLATOR PLACEMENT       SECTION         Review of patient's allergies indicates:   Allergen Reactions    Aspirin Nausea And Vomiting    Codeine Nausea And Vomiting       No current facility-administered medications on file prior to encounter.     Current Outpatient Medications on File Prior to Encounter   Medication Sig    ALPRAZolam (XANAX) 1 MG tablet Take 1 tablet (1 mg total) by mouth daily as needed for Anxiety. May take 1/2 pill as needed as well    amitriptyline (ELAVIL) 100 MG tablet Take 3 tablets (300 mg total) by mouth every evening.    atorvastatin (LIPITOR) 20 MG tablet Take 20 mg by mouth once daily.    carvedilol (COREG) 6.25 MG tablet TAKE 2 TABLET BY MOUTH EVERY MORNING AND EVERY EVENING. TAKE MORNING DOSE RIGHT BEFORE LISINOPRIL    cholecalciferol, vitamin D3, (VITAMIN D3) 4,000 unit Cap Take 4,000 mg by mouth once daily.    famotidine (PEPCID) 20 MG tablet Take 1 tablet (20 mg total) by mouth 2 (two) times daily.    furosemide (LASIX) 40 MG tablet Take 1 tablet (40 mg total) by mouth once daily.    hydrocodone-acetaminophen 5-325mg (NORCO) 5-325 mg per tablet Take 1 tablet by mouth every 8 (eight) hours as needed for Pain.    latanoprost 0.005 % ophthalmic solution     lisinopril (PRINIVIL,ZESTRIL) 40 MG tablet Take 1 tablet (40 mg total) by mouth once daily.    mecobal/levomefolat Ca/B6 phos (METANX ORAL) Take by mouth.    metformin (GLUCOPHAGE) 500 MG tablet TAKE 2 TABLETS BY MOUTH TWICE DAILY    omega-3 acid ethyl  esters (LOVAZA) 1 gram capsule TAKE 1 CAPSULE BY MOUTH TWICE DAILY    ondansetron (ZOFRAN) 4 MG tablet Take 1 tablet (4 mg total) by mouth every 6 (six) hours as needed.    perphenazine-amitriptyline 4-50 mg Tab Take 2 tablets by mouth at bedtime    potassium chloride (KLOR-CON) 10 MEQ TbSR Take 2 tablets (20 mEq total) by mouth once daily.    pravastatin (PRAVACHOL) 40 MG tablet Take 1 tablet (40 mg total) by mouth once daily.    SACUBITRIL/VALSARTAN (ENTRESTO ORAL) Take by mouth.     Family History       Problem Relation (Age of Onset)    Cancer Maternal Aunt, Paternal Aunt    Depression Sister    Early death Mother    Hypertension Mother, Father    Stroke Father          Tobacco Use    Smoking status: Never    Smokeless tobacco: Never   Substance and Sexual Activity    Alcohol use: No    Drug use: No    Sexual activity: Not on file     Review of Systems   Unable to perform ROS: Intubated   Objective:     Vital Signs (Most Recent):  Temp: (!) 95.7 °F (35.4 °C) (09/02/22 0955)  Pulse: 93 (09/02/22 0955)  Resp: (!) 38 (09/02/22 0955)  BP: 136/84 (09/02/22 0847)  SpO2: (!) 92 % (09/02/22 0915)   Vital Signs (24h Range):  Temp:  [94.3 °F (34.6 °C)-96.7 °F (35.9 °C)] 95.7 °F (35.4 °C)  Pulse:  [] 93  Resp:  [0-38] 38  SpO2:  [81 %-100 %] 92 %  BP: ()/(47-97) 136/84     Weight: 88.5 kg (195 lb)  Body mass index is 33.47 kg/m².    SpO2: (!) 92 %  O2 Device (Oxygen Therapy): ventilator    No intake or output data in the 24 hours ending 09/02/22 1010    Lines/Drains/Airways       Peripherally Inserted Central Catheter Line  Duration             PICC Triple Lumen 09/02/22 0855 right basilic <1 day              Drain  Duration                  NG/OG Tube 09/02/22 1000 Left mouth <1 day         Urethral Catheter 09/02/22 0602 16 Fr. <1 day              Airway  Duration                  Airway - Non-Surgical 09/02/22 <1 day              Peripheral Intravenous Line  Duration                  Peripheral IV  - Single Lumen 09/02/22 0619 20 G Right Antecubital <1 day         Peripheral IV - Single Lumen 09/02/22 0620 22 G Posterior;Right Hand <1 day                    Physical Exam  Constitutional:       Comments: Intub/unresponsive   HENT:      Head: Normocephalic and atraumatic.   Cardiovascular:      Rate and Rhythm: Regular rhythm. Tachycardia present.      Heart sounds: Heart sounds are distant. Murmur heard.     No friction rub. No gallop.   Abdominal:      General: There is no distension.      Palpations: Abdomen is soft.   Musculoskeletal:      Cervical back: No rigidity.      Right lower leg: No edema.      Left lower leg: No edema.   Skin:     General: Skin is warm and dry.   Neurological:      Comments: Intub/unresponsive   Psychiatric:      Comments: Intub/unresponsive       Current Medications:   piperacillin-tazobactam (ZOSYN) IVPB  4.5 g Intravenous Q8H    sodium chloride 0.9%  10 mL Intravenous Q6H    vancomycin (VANCOCIN) IVPB  2,000 mg Intravenous Once      sodium chloride 0.9%      amiodarone in dextrose 5% 1 mg/min (09/02/22 0758)    amiodarone in dextrose 5%      NORepinephrine bitartrate-D5W       dextrose 10%, dextrose 10%, dextrose 50%, glucagon (human recombinant), insulin aspart U-100, Flushing PICC Protocol **AND** sodium chloride 0.9% **AND** sodium chloride 0.9%    Laboratory (all labs reviewed):  CBC:  Recent Labs   Lab 09/02/22 0618   WBC 13.15 H   Hemoglobin 12.3   Hematocrit 38.0   Platelets SEE COMMENT       CHEMISTRIES:  Recent Labs   Lab 09/02/22 0618   Glucose 243 H   Sodium 139   Potassium 4.2   BUN 15   Creatinine 1.0   Calcium 8.1 L   Magnesium 2.0       CARDIAC BIOMARKERS:  Recent Labs   Lab 09/02/22 0618   Troponin I 0.132 H       COAGS:  Recent Labs   Lab 09/02/22 0618   INR 1.7 H       LIPIDS/LFTS:  Recent Labs   Lab 09/02/22 0618    H    H       BNP:  Recent Labs   Lab 09/02/22 0618   BNP 50       TSH:        Free T4:        Diagnostic Results:  ECG  (personally reviewed and interpreted tracing(s)):  9/2/22 0628 SR 92, LBBB    Chest X-Ray (personally reviewed and interpreted image(s)): 9/2/22 CHD, CMeg, Left ICD 1 lead.    Echo: 05/11/2021 (cer everywhere) (repeat ordered)   NSR 79 bpm     Normal right heart size     Mild TR / PAsys ~ 26 mmHg     ICD wire in right heart     LA mildly enlarged / LAD 44 mm     Normal MV  mild MR     Dilated and severely reduced LV systolic function      EF ~ 15%     Diastolic dysfunction / indeterminate grade (probably grade I)     Normal aortic valve     No AS and no AR     minimal pericardial effusion            Assessment and Plan:     * Cardiac arrest  VT arrest with ROSC by EMS  Known CMP, hx peripartum CMP (?prior isch eval)  CAHP score determined by duration of arrest prior to CPR  Cont amio and vasopressor support  Check echo  Further testing pending clinical course  MDT ICD in place    Chronic combined systolic and diastolic congestive heart failure  Med rx on hold  Will resume pending clinical course  Known CMP, hx peripartum CMP (?prior isch eval)  MDT ICD in place    ICD (implantable cardioverter-defibrillator), dual, in situ  [employer]tr[employer]    HTN (hypertension)  Med rx on hold    Transaminitis  ?shock liver    Hyperlipidemia  Hold statin with elev LFTs        VTE Risk Mitigation (From admission, onward)    None        Pt seen in ICU, critical care time 45min    Thank you for your consult. I will follow-up with patient. Please contact us if you have any additional questions.    Pedro Jara MD  Cardiology   [facility] - [place] - Intensive Care

## 2022-09-02 NOTE — HPI
62 y.o. female, with a medical history of Hypertension, CHF (EF 15%), Diabetes, Hyperlipidemia,DM, Obesity and Cardiomyopathy, who presents to the ED via EMS transportation for cardiac arrest. Initial vitals in the ED with a temperature of 96.7 F rectally, blood pressure 67/47, RR 12,Per EMS,  reported she had sudden onset respiratory distress.  EMS reports the patient's  then watched her collapse.  And immediately started CPR.  Per EMS, it is estimated that was approximately 2-3 minutes between collapse and start of CPR.  They report EMS was immediately on scene.  They estimate approximately 20 minutes between initiation of CPR and Rosc.  Prior to Rosc, the patient received 5 epis, 1 amp of bicarb, 300 of amiodarone.  They report that she was in pulseless V-tach or VFib and shocked her 4 times.  They report a junctional rhythm after the 3rd shock.[employer] was called,, who states that the device interrogated and revealed that patient had several episodes of V-tach starting at 4:30 am to 5:30 am. She was shocked twice, once at 20 joules and again at 35 joules. Both defibrillators converted the patient.now per ER family say patient may was longer time unresponsive,patient in ER is intubated,head CT show no acute process,chest X ray show ground glass opacities,patient has been started on IV Abx,she has no gag reflex,fixed pupillae,not responding,cardiology is consulted and patient has been  started on amiodarone gtt and levophed for shock.not able do ROS duo to condition of patient.

## 2022-09-02 NOTE — RESPIRATORY THERAPY
Patient with cuff leak om ETT.  Changed by Dr. Judge with [email or web address].  7.5 ETT placed, Bilateral breath sounds

## 2022-09-02 NOTE — PROCEDURES
"Marlena DODGE is a 62 y.o. female patient.    Temp: (!) 94.5 °F (34.7 °C) (09/02/22 1300)  Pulse: (!) 31 (09/02/22 1300)  Resp: (!) 31 (09/02/22 1300)  BP: (!) 135/90 (09/02/22 1300)  SpO2: (!) 74 % (09/02/22 1300)  Weight: 88.5 kg (195 lb) (09/02/22 0606)  Height: 5' 4" (162.6 cm) (09/02/22 0602)       Arterial Line    Date/Time: 9/2/2022 2:25 PM  Location procedure was performed: [facility]  Performed by: Cecilia Ferreira MD  Authorized by: Cecilia Ferreira MD   Consent Done: Emergent Situation  Preparation: Patient was prepped and draped in the usual sterile fashion.  Indications: multiple ABGs, respiratory failure and hemodynamic monitoring  Location: right radial    Patient sedated: no  Liam's test normal: yes  Needle gauge: 20  Seldinger technique: Seldinger technique used  Number of attempts: 3  Complications: No  Estimated blood loss (mL): 10  Specimens: No  Implants: No  Post-procedure: line sutured and dressing applied  Post-procedure CMS: unchanged  Patient tolerance: Patient tolerated the procedure well with no immediate complications        9/2/2022    "

## 2022-09-02 NOTE — CONSULTS
[facility] - Intensive Care  Critical Care Medicine  Consult Note    Patient Name: Marlena DODGE  MRN: [id number]  Admission Date: 9/2/2022  Hospital Length of Stay: 0 days  Code Status: Prior  Attending Physician: Bereket Darden MD   Primary Care Provider: Skyler Garcia MD   Principal Problem: Cardiac arrest    [unfilled]  Subjective:     HPI:  62 y.o. female, with a medical history of Hypertension, CHF (EF 15%), Diabetes, Hyperlipidemia,DM, Obesity and Cardiomyopathy, who presents to the ED via EMS transportation for cardiac arrest. On presentation to the ICU patient unresponsive, intubated, and not on any form of sedation. Remainder of HPI is per chart review: Per EMS,  reported she had sudden onset respiratory distress.  EMS reports the patient's  then watched her collapse.  And immediately started CPR.  Per EMS, it is estimated that was approximately 2-3 minutes between collapse and start of CPR.  They report EMS was immediately on scene.  They estimate approximately 20 minutes between initiation of CPR and Rosc.  Prior to Rosc, the patient received 5 epis, 1 amp of bicarb, 300 of amiodarone.  They report that she was in pulseless V-tach or VFib and shocked her 4 times.  They report a junctional rhythm after the 3rd shock.[employer] was called,, who states that the device interrogated and revealed that patient had several episodes of V-tach starting at 4:30 am to 5:30 am. She was shocked twice, once at 20 joules and again at 35 joules.  Update:  presented to ICU.  reported to myself that he woke up around 3am today. He tried to wake up his wife at that time but she would not respond. He reports she felt cool to the touch so he called 911 and started chest compressions. He is concerned for possible accidental overdose of benzodiazepines and opiates.       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%    Diastolic dysfunction with heart failure 2015    Echo     HTN (hypertension)     Hx of psychiatric care     Hyperglycemia     Hyperlipidemia     ICD (implantable cardioverter-defibrillator), dual, in situ     [employer]     LAE (left atrial enlargement) 2015    MR (mitral regurgitation) 2015    mod    Panic disorder     Psychiatric problem     Therapy     Type 2 diabetes mellitus 2022       Past Surgical History:   Procedure Laterality Date    CARDIAC DEFIBRILLATOR PLACEMENT       SECTION         Review of patient's allergies indicates:   Allergen Reactions    Aspirin Nausea And Vomiting    Codeine Nausea And Vomiting       Family History       Problem Relation (Age of Onset)    Cancer Maternal Aunt, Paternal Aunt    Depression Sister    Early death Mother    Hypertension Mother, Father    Stroke Father          Tobacco Use    Smoking status: Never    Smokeless tobacco: Never   Substance and Sexual Activity    Alcohol use: No    Drug use: No    Sexual activity: Not on file         Review of Systems   Unable to perform ROS: Patient unresponsive   Objective:     Vital Signs (Most Recent):  Temp: (!) 94.5 °F (34.7 °C) (22 1215)  Pulse: (!) 31 (22 1230)  Resp: (!) 31 (22 1230)  BP: (!) 144/99 (22 1230)  SpO2: (!) 75 % (22 1230)   Vital Signs (24h Range):  Temp:  [94.3 °F (34.6 °C)-96.7 °F (35.9 °C)] 94.5 °F (34.7 °C)  Pulse:  [] 31  Resp:  [0-47] 31  SpO2:  [70 %-100 %] 75 %  BP: ()/() 144/99     Weight: 88.5 kg (195 lb)  Body mass index is 33.47 kg/m².      Intake/Output Summary (Last 24 hours) at 2022 1242  Last data filed at 2022 1119  Gross per 24 hour   Intake 2173.76 ml   Output --   Net 2173.76 ml       Physical Exam  Vitals and nursing note reviewed.    Constitutional:       Appearance: She is obese. She is toxic-appearing. She is not diaphoretic.   HENT:      Head: Normocephalic and atraumatic.      Nose: Nose normal.      Mouth/Throat:      Mouth: Mucous membranes are moist.      Comments: ET Tube and OG tube in place  Eyes:      Conjunctiva/sclera: Conjunctivae normal.      Comments: Pupils non reactive to light; fixed   Cardiovascular:      Rate and Rhythm: Normal rate and regular rhythm.      Pulses: Normal pulses.      Heart sounds: Normal heart sounds.   Pulmonary:      Effort: Respiratory distress present.      Comments: On ventilator  Ventilator breath sounds present  Slightly diminished on the left  Abdominal:      General: There is distension.      Palpations: Abdomen is soft.   Skin:     Capillary Refill: Capillary refill takes 2 to 3 seconds.      Comments: Cold to touch   Neurological:      Mental Status: She is unresponsive.      GCS: GCS eye subscore is 1. GCS verbal subscore is 1. GCS motor subscore is 1.      Cranial Nerves: Cranial nerve deficit (No pupil response to light) present.      Sensory: Sensory deficit (No response to pain in all 4 extremities) present.       Vents:  Vent Mode: PRVC (09/02/22 0955)  Ventilator Initiated: Yes (09/02/22 0605)  Set Rate: 22 BPM (09/02/22 0955)  Vt Set: 380 mL (09/02/22 0955)  PEEP/CPAP: 10 cmH20 (09/02/22 0955)  Oxygen Concentration (%): 100 (09/02/22 1230)  Peak Airway Pressure: 20.1 cmH2O (09/02/22 0955)  Total Ve: 9.4 mL (09/02/22 0955)  F/VT Ratio<105 (RSBI): 153.97 (09/02/22 0955)    Lines/Drains/Airways       Peripherally Inserted Central Catheter Line  Duration             PICC Triple Lumen 09/02/22 0855 right basilic <1 day              Drain  Duration                  NG/OG Tube 09/02/22 1000 Left mouth <1 day         Urethral Catheter 09/02/22 0602 16 Fr. <1 day              Airway  Duration                  Airway - Non-Surgical 09/02/22 <1 day              Peripheral Intravenous Line   Duration                  Peripheral IV - Single Lumen 09/02/22 0619 20 G Right Antecubital <1 day         Peripheral IV - Single Lumen 09/02/22 0620 22 G Posterior;Right Hand <1 day                    Significant Labs:    CBC/Anemia Profile:  Recent Labs   Lab 09/02/22 0618   WBC 13.15*   HGB 12.3   HCT 38.0   PLT SEE COMMENT   MCV 94   RDW 14.1        Chemistries:  Recent Labs   Lab 09/02/22 0618      K 4.2      CO2 19*   BUN 15   CREATININE 1.0   CALCIUM 8.1*   ALBUMIN 2.9*   PROT 5.9*   BILITOT 0.3   ALKPHOS 128   *   *   MG 2.0   PHOS 5.9*       All pertinent labs within the past 24 hours have been reviewed.    Significant Imaging:   I have reviewed all pertinent imaging results/findings within the past 24 hours.      ABG  Recent Labs   Lab 09/02/22  1157   PH 7.319*   PO2 42*   PCO2 35.5   HCO3 18.3*   BE -7     Assessment/Plan:     Cardiac/Vascular  * Cardiac arrest  - Patient status post cardiac arrest with multiple shocks; ROSC achieved by EMS. Patient intubaPatient linda in the field  - CXR confirmed placement of ET tube x2  - currently on ventilator; current settings VC 22/15/380/100%  - Arterial line placed; concerning arterial blood gas 7.3/35/42/18 for continued hypoxia  - Plan for repeat CT head Saturday morning  - Continued hypoxia despite good ventilation; some concern for possible PE vs AV shunt  - Echo and CTA ordered for further evaluation  - Continue to have target body temperature of 36C  - Limit fluids 2/2 minimal EF    Shock circulatory  - Patient with good blood pressures on cuff and arterial line  - Continue to support blood pressure as needed with Levophed titrated to maintain MAP >65        Critical Care Daily Checklist:    A: Awake: RASS Goal/Actual Goal:    Actual:     B: Spontaneous Breathing Trial Performed?     C: SAT & SBT Coordinated?  NA                      D: Delirium: CAM-ICU     E: Early Mobility Performed? NA   F: Feeding Goal:    Status:     Current  Diet Order   Procedures    Diet NPO      AS: Analgesia/Sedation None   T: Thromboembolic Prophylaxis None   H: HOB > 300 Yes   U: Stress Ulcer Prophylaxis (if needed) None   G: Glucose Control LDSSI   B: Bowel Function     I: Indwelling Catheter (Lines & Henderson) Necessity Yes   D: De-escalation of Antimicrobials/Pharmacotherapies NA    Plan for the day/ETD Support respiratory status; f/u CTA/Echo    Code Status:  Family/Goals of Care: Prior  Full care       Critical secondary to Patient has a condition that poses threat to life and bodily function: Severe Respiratory Distress     Critical care was time spent personally by me on the following activities: development of treatment plan with patient or surrogate and bedside caregivers, discussions with consultants, evaluation of patient's response to treatment, examination of patient, ordering and performing treatments and interventions, ordering and review of laboratory studies, ordering and review of radiographic studies, pulse oximetry, re-evaluation of patient's condition. This critical care time did not overlap with that of any other provider or involve time for any procedures.    Thank you for your consult. I will follow-up with patient. Please contact us if you have any additional questions.     Itz Pritchett MD  Critical Care Medicine PGYII  [facility] - [place] - Intensive Care

## 2022-09-02 NOTE — HPI
62 y.o. female, with a medical history of Hypertension, CHF (EF 15%), Diabetes, Hyperlipidemia,DM, Obesity and Cardiomyopathy, who presents to the ED via EMS transportation for cardiac arrest. On presentation to the ICU patient unresponsive, intubated, and not on any form of sedation. Remainder of HPI is per chart review: Per EMS,  reported she had sudden onset respiratory distress.  EMS reports the patient's  then watched her collapse.  And immediately started CPR.  Per EMS, it is estimated that was approximately 2-3 minutes between collapse and start of CPR.  They report EMS was immediately on scene.  They estimate approximately 20 minutes between initiation of CPR and Rosc.  Prior to Rosc, the patient received 5 epis, 1 amp of bicarb, 300 of amiodarone.  They report that she was in pulseless V-tach or VFib and shocked her 4 times.  They report a junctional rhythm after the 3rd shock.[employer] was called,, who states that the device interrogated and revealed that patient had several episodes of V-tach starting at 4:30 am to 5:30 am. She was shocked twice, once at 20 joules and again at 35 joules.  Update:  presented to ICU.  reported to myself that he woke up around 3am today. He tried to wake up his wife at that time but she would not respond. He reports she felt cool to the touch so he called 911 and started chest compressions. He is concerned for possible accidental overdose of benzodiazepines and opiates.

## 2022-09-02 NOTE — ASSESSMENT & PLAN NOTE
- Patient with good blood pressures on cuff and arterial line  - Continue to support blood pressure as needed with Levophed titrated to maintain MAP >65

## 2022-09-02 NOTE — SUBJECTIVE & OBJECTIVE
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%    Diastolic dysfunction with heart failure 2015    Echo     HTN (hypertension)     Hx of psychiatric care     Hyperglycemia     Hyperlipidemia     ICD (implantable cardioverter-defibrillator), dual, in situ     Medtronic     LAE (left atrial enlargement) 2015    MR (mitral regurgitation) 2015    mod    Panic disorder     Psychiatric problem     Therapy     Type 2 diabetes mellitus 2022       Past Surgical History:   Procedure Laterality Date    CARDIAC DEFIBRILLATOR PLACEMENT       SECTION         Review of patient's allergies indicates:   Allergen Reactions    Aspirin Nausea And Vomiting    Codeine Nausea And Vomiting       Family History       Problem Relation (Age of Onset)    Cancer Maternal Aunt, Paternal Aunt    Depression Sister    Early death Mother    Hypertension Mother, Father    Stroke Father          Tobacco Use    Smoking status: Never    Smokeless tobacco: Never   Substance and Sexual Activity    Alcohol use: No    Drug use: No    Sexual activity: Not on file         Review of Systems   Unable to perform ROS: Patient unresponsive   Objective:     Vital Signs (Most Recent):  Temp: (!) 94.5 °F (34.7 °C) (22 1215)  Pulse: (!) 31 (22 1230)  Resp: (!) 31 (22 1230)  BP: (!) 144/99 (22 1230)  SpO2: (!) 75 % (22 1230)   Vital Signs (24h Range):  Temp:  [94.3 °F (34.6 °C)-96.7 °F (35.9 °C)] 94.5 °F (34.7 °C)  Pulse:  [] 31  Resp:  [0-47] 31  SpO2:  [70 %-100 %] 75 %  BP: ()/() 144/99     Weight: 88.5 kg (195 lb)  Body mass index is 33.47 kg/m².      Intake/Output Summary (Last 24 hours) at 2022 1242  Last data filed at 2022 1119  Gross per 24 hour   Intake 2173.76 ml   Output --   Net 2173.76 ml       Physical  Exam  Vitals and nursing note reviewed.   Constitutional:       Appearance: She is obese. She is toxic-appearing. She is not diaphoretic.   HENT:      Head: Normocephalic and atraumatic.      Nose: Nose normal.      Mouth/Throat:      Mouth: Mucous membranes are moist.      Comments: ET Tube and OG tube in place  Eyes:      Conjunctiva/sclera: Conjunctivae normal.      Comments: Pupils non reactive to light; fixed   Cardiovascular:      Rate and Rhythm: Normal rate and regular rhythm.      Pulses: Normal pulses.      Heart sounds: Normal heart sounds.   Pulmonary:      Effort: Respiratory distress present.      Comments: On ventilator  Ventilator breath sounds present  Slightly diminished on the left  Abdominal:      General: There is distension.      Palpations: Abdomen is soft.   Skin:     Capillary Refill: Capillary refill takes 2 to 3 seconds.      Comments: Cold to touch   Neurological:      Mental Status: She is unresponsive.      GCS: GCS eye subscore is 1. GCS verbal subscore is 1. GCS motor subscore is 1.      Cranial Nerves: Cranial nerve deficit (No pupil response to light) present.      Sensory: Sensory deficit (No response to pain in all 4 extremities) present.       Vents:  Vent Mode: PRVC (09/02/22 0955)  Ventilator Initiated: Yes (09/02/22 0605)  Set Rate: 22 BPM (09/02/22 0955)  Vt Set: 380 mL (09/02/22 0955)  PEEP/CPAP: 10 cmH20 (09/02/22 0955)  Oxygen Concentration (%): 100 (09/02/22 1230)  Peak Airway Pressure: 20.1 cmH2O (09/02/22 0955)  Total Ve: 9.4 mL (09/02/22 0955)  F/VT Ratio<105 (RSBI): 153.97 (09/02/22 0955)    Lines/Drains/Airways       Peripherally Inserted Central Catheter Line  Duration             PICC Triple Lumen 09/02/22 0855 right basilic <1 day              Drain  Duration                  NG/OG Tube 09/02/22 1000 Left mouth <1 day         Urethral Catheter 09/02/22 0602 16 Fr. <1 day              Airway  Duration                  Airway - Non-Surgical 09/02/22 <1 day               Peripheral Intravenous Line  Duration                  Peripheral IV - Single Lumen 09/02/22 0619 20 G Right Antecubital <1 day         Peripheral IV - Single Lumen 09/02/22 0620 22 G Posterior;Right Hand <1 day                    Significant Labs:    CBC/Anemia Profile:  Recent Labs   Lab 09/02/22 0618   WBC 13.15*   HGB 12.3   HCT 38.0   PLT SEE COMMENT   MCV 94   RDW 14.1        Chemistries:  Recent Labs   Lab 09/02/22 0618      K 4.2      CO2 19*   BUN 15   CREATININE 1.0   CALCIUM 8.1*   ALBUMIN 2.9*   PROT 5.9*   BILITOT 0.3   ALKPHOS 128   *   *   MG 2.0   PHOS 5.9*       All pertinent labs within the past 24 hours have been reviewed.    Significant Imaging:   I have reviewed all pertinent imaging results/findings within the past 24 hours.

## 2022-09-02 NOTE — ED NOTES
Unable to start LR or Vanco due to lack of IV access. Pt only has two lines present on RUE. PICC team consulted.

## 2022-09-02 NOTE — SUBJECTIVE & OBJECTIVE
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%    Diastolic dysfunction with heart failure 2015    Echo     HTN (hypertension)     Hx of psychiatric care     Hyperglycemia     Hyperlipidemia     ICD (implantable cardioverter-defibrillator), dual, in situ     Medtronic     LAE (left atrial enlargement) 2015    MR (mitral regurgitation) 2015    mod    Panic disorder     Psychiatric problem     Therapy     Type 2 diabetes mellitus 2022       Past Surgical History:   Procedure Laterality Date    CARDIAC DEFIBRILLATOR PLACEMENT       SECTION         Review of patient's allergies indicates:   Allergen Reactions    Aspirin Nausea And Vomiting    Codeine Nausea And Vomiting       No current facility-administered medications on file prior to encounter.     Current Outpatient Medications on File Prior to Encounter   Medication Sig    ALPRAZolam (XANAX) 1 MG tablet Take 1 tablet (1 mg total) by mouth daily as needed for Anxiety. May take 1/2 pill as needed as well    amitriptyline (ELAVIL) 100 MG tablet Take 3 tablets (300 mg total) by mouth every evening.    atorvastatin (LIPITOR) 20 MG tablet Take 20 mg by mouth once daily.    carvedilol (COREG) 6.25 MG tablet TAKE 2 TABLET BY MOUTH EVERY MORNING AND EVERY EVENING. TAKE MORNING DOSE RIGHT BEFORE LISINOPRIL    cholecalciferol, vitamin D3, (VITAMIN D3) 4,000 unit Cap Take 4,000 mg by mouth once daily.    famotidine (PEPCID) 20 MG tablet Take 1 tablet (20 mg total) by mouth 2 (two) times daily.    furosemide (LASIX) 40 MG tablet Take 1 tablet (40 mg total) by mouth once daily.    hydrocodone-acetaminophen 5-325mg (NORCO) 5-325 mg per tablet Take 1 tablet by mouth every 8 (eight) hours as needed for Pain.    latanoprost 0.005 % ophthalmic solution     lisinopril (PRINIVIL,ZESTRIL) 40 MG  tablet Take 1 tablet (40 mg total) by mouth once daily.    mecobal/levomefolat Ca/B6 phos (METANX ORAL) Take by mouth.    metformin (GLUCOPHAGE) 500 MG tablet TAKE 2 TABLETS BY MOUTH TWICE DAILY    omega-3 acid ethyl esters (LOVAZA) 1 gram capsule TAKE 1 CAPSULE BY MOUTH TWICE DAILY    ondansetron (ZOFRAN) 4 MG tablet Take 1 tablet (4 mg total) by mouth every 6 (six) hours as needed.    perphenazine-amitriptyline 4-50 mg Tab Take 2 tablets by mouth at bedtime    potassium chloride (KLOR-CON) 10 MEQ TbSR Take 2 tablets (20 mEq total) by mouth once daily.    pravastatin (PRAVACHOL) 40 MG tablet Take 1 tablet (40 mg total) by mouth once daily.    SACUBITRIL/VALSARTAN (ENTRESTO ORAL) Take by mouth.     Family History       Problem Relation (Age of Onset)    Cancer Maternal Aunt, Paternal Aunt    Depression Sister    Early death Mother    Hypertension Mother, Father    Stroke Father          Tobacco Use    Smoking status: Never    Smokeless tobacco: Never   Substance and Sexual Activity    Alcohol use: No    Drug use: No    Sexual activity: Not on file     Review of Systems   Unable to perform ROS: Intubated   Objective:     Vital Signs (Most Recent):  Temp: (!) 95.7 °F (35.4 °C) (09/02/22 0955)  Pulse: 93 (09/02/22 0955)  Resp: (!) 38 (09/02/22 0955)  BP: 136/84 (09/02/22 0847)  SpO2: (!) 92 % (09/02/22 0915)   Vital Signs (24h Range):  Temp:  [94.3 °F (34.6 °C)-96.7 °F (35.9 °C)] 95.7 °F (35.4 °C)  Pulse:  [] 93  Resp:  [0-38] 38  SpO2:  [81 %-100 %] 92 %  BP: ()/(47-97) 136/84     Weight: 88.5 kg (195 lb)  Body mass index is 33.47 kg/m².    SpO2: (!) 92 %  O2 Device (Oxygen Therapy): ventilator    No intake or output data in the 24 hours ending 09/02/22 1010    Lines/Drains/Airways       Peripherally Inserted Central Catheter Line  Duration             PICC Triple Lumen 09/02/22 0855 right basilic <1 day              Drain  Duration                  NG/OG Tube 09/02/22 1000 Left mouth <1 day          Urethral Catheter 09/02/22 0602 16 Fr. <1 day              Airway  Duration                  Airway - Non-Surgical 09/02/22 <1 day              Peripheral Intravenous Line  Duration                  Peripheral IV - Single Lumen 09/02/22 0619 20 G Right Antecubital <1 day         Peripheral IV - Single Lumen 09/02/22 0620 22 G Posterior;Right Hand <1 day                    Physical Exam  Constitutional:       Comments: Intub/unresponsive   HENT:      Head: Normocephalic and atraumatic.   Cardiovascular:      Rate and Rhythm: Regular rhythm. Tachycardia present.      Heart sounds: Heart sounds are distant. Murmur heard.     No friction rub. No gallop.   Abdominal:      General: There is no distension.      Palpations: Abdomen is soft.   Musculoskeletal:      Cervical back: No rigidity.      Right lower leg: No edema.      Left lower leg: No edema.   Skin:     General: Skin is warm and dry.   Neurological:      Comments: Intub/unresponsive   Psychiatric:      Comments: Intub/unresponsive       Current Medications:   piperacillin-tazobactam (ZOSYN) IVPB  4.5 g Intravenous Q8H    sodium chloride 0.9%  10 mL Intravenous Q6H    vancomycin (VANCOCIN) IVPB  2,000 mg Intravenous Once      sodium chloride 0.9%      amiodarone in dextrose 5% 1 mg/min (09/02/22 0758)    amiodarone in dextrose 5%      NORepinephrine bitartrate-D5W       dextrose 10%, dextrose 10%, dextrose 50%, glucagon (human recombinant), insulin aspart U-100, Flushing PICC Protocol **AND** sodium chloride 0.9% **AND** sodium chloride 0.9%    Laboratory (all labs reviewed):  CBC:  Recent Labs   Lab 09/02/22 0618   WBC 13.15 H   Hemoglobin 12.3   Hematocrit 38.0   Platelets SEE COMMENT       CHEMISTRIES:  Recent Labs   Lab 09/02/22  0618   Glucose 243 H   Sodium 139   Potassium 4.2   BUN 15   Creatinine 1.0   Calcium 8.1 L   Magnesium 2.0       CARDIAC BIOMARKERS:  Recent Labs   Lab 09/02/22 0618   Troponin I 0.132 H       COAGS:  Recent Labs   Lab  09/02/22  0618   INR 1.7 H       LIPIDS/LFTS:  Recent Labs   Lab 09/02/22  0618    H    H       BNP:  Recent Labs   Lab 09/02/22  0618   BNP 50       TSH:        Free T4:        Diagnostic Results:  ECG (personally reviewed and interpreted tracing(s)):  9/2/22 0628 SR 92, LBBB    Chest X-Ray (personally reviewed and interpreted image(s)): 9/2/22 CHD, CMeg, Left ICD 1 lead.    Echo: 05/11/2021 (cer everywhere) (repeat ordered)   NSR 79 bpm     Normal right heart size     Mild TR / PAsys ~ 26 mmHg     ICD wire in right heart     LA mildly enlarged / LAD 44 mm     Normal MV  mild MR     Dilated and severely reduced LV systolic function      EF ~ 15%     Diastolic dysfunction / indeterminate grade (probably grade I)     Normal aortic valve     No AS and no AR     minimal pericardial effusion

## 2022-09-02 NOTE — ED TRIAGE NOTES
"Pt presents to ED via EMS d/t cardiac arrest. Per EMS, pt was found agonally breathing, 4 shocks total given. ROSC after 3rd shock. 100 bicarb , x5 of epi and 300  amio given. Per EMS,  stated, " too much lisinopril". CPR was initiated by . Per , 2-3 mins between time of collapse and initiation of CPR.   "

## 2022-09-02 NOTE — CLINICAL REVIEW
IP Sepsis Screen (most recent)       Sepsis Screen (IP) - 09/02/22 1008       Is the patient's history or complaint suggestive of a possible infection? Yes  -AD    Are there at least two of the following signs and symptoms present? Yes  -AD    Sepsis signs/symptoms - Hyper or Hypothermia Hyperthermia >100.4 or Hypothermia < 96.8  -AD    Sepsis signs/symptoms - Tachycardia Tachycardia     >90  -AD    Sepsis signs/symptoms - Tachypnea Tachypnea     >20  -AD    Sepsis signs/symptoms - WBC WBC < 4,000 or WBC > 12,000  -AD    Sepsis signs/symptoms - Altered Mental Status Altered Mental Status  -AD    Are any of the following organ dysfunction criteria present and not considered to be due to a chronic condition? Yes  -AD    Organ Dysfunction Criteria Lactate > 2.0  -AD    Organ Dysfunction Criteria - Resp Comp Respiratory compromise requiring Bipap, Cpap, or Intubation  -AD    Initiate Sepsis Protocol No  -AD    Reason sepsis not considered Pt. receiving appropriate management  -AD              User Key  (r) = Recorded By, (t) = Taken By, (c) = Cosigned By      Initials Name    AD Yasmin Mcarthur RN

## 2022-09-02 NOTE — ED PROVIDER NOTES
Encounter Date: 9/2/2022    SCRIBE #1 NOTE: I, Julissa Garcia, am scribing for, and in the presence of,  Génesis Carbone DO. I have scribed the following portions of the note - Other sections scribed: [place] of Care: HPI, ROS, PE, MDM.     History     Chief Complaint   Patient presents with    Cardiac Arrest     Pt arrived via EMS. Pt found unresponsive by spouse with CPR initiated at 2-3 minutes after witnessed collapse. Per EMS, pt was found in persistent VF and pVT with defibrillation x4; ROSC obtained @ 3rd shock; EMS administered Epi x5, 100 mEq Sodium Bicarb, and 300mg Amiodarone.     60s year old female presenting with witnessed cardiac arrest.  Per EMS,  reported she had sudden onset respiratory distress.  EMS reports the patient's  then watched her collapse.  And immediately started CPR.  Per EMS, it is estimated that was approximately 2-3 minutes between collapse and start of CPR.  They report EMS was immediately on scene.  They estimate approximately 20 minutes between initiation of CPR and Rosc.  Prior to Jones, the patient received 5 epis, 1 amp of bicarb, 300 of amiodarone.  They report that she was in pulseless V-tach or VFib and shocked her 4 times.  They report a junctional rhythm after the 3rd shock.  History of hypertension.  Remainder of medical history and history of present illness otherwise limited due to patient condition.    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%    Diastolic dysfunction with heart failure 4/30/2015    Echo 5/14    HTN (hypertension)     Hx of psychiatric care     Hyperglycemia     Hyperlipidemia     ICD (implantable cardioverter-defibrillator), dual, in situ      Medtronic     LAE (left atrial enlargement) 2015    MR (mitral regurgitation) 2015    mod    Panic disorder     Psychiatric problem     Therapy     Type 2 diabetes mellitus 2022     Past Surgical History:   Procedure Laterality Date    CARDIAC DEFIBRILLATOR PLACEMENT       SECTION       Family History   Problem Relation Age of Onset    Early death Mother     Hypertension Mother     Hypertension Father     Stroke Father     Depression Sister     Cancer Maternal Aunt     Cancer Paternal Aunt      Social History     Tobacco Use    Smoking status: Never    Smokeless tobacco: Never   Substance Use Topics    Alcohol use: No    Drug use: No     Review of Systems   Unable to perform ROS: Acuity of condition     Physical Exam     Initial Vitals [22 0602]   BP Pulse Resp Temp SpO2   (!) 67/47 95 12 (S) 96.7 °F (35.9 °C) 100 %      MAP       --         Physical Exam    Nursing note and vitals reviewed.  Constitutional: She appears well-developed and well-nourished. She is not diaphoretic. No distress.   HENT:   Head: Normocephalic and atraumatic.   Nose: Nose normal.   Eyes: EOM are normal. No scleral icterus.   Pupils fixed   Neck: Neck supple.   Normal range of motion.  Cardiovascular:  Normal rate, regular rhythm, normal heart sounds and intact distal pulses.     Exam reveals no gallop and no friction rub.       No murmur heard.  Pulmonary/Chest: Breath sounds normal. No stridor. No respiratory distress. She has no wheezes. She has no rhonchi. She has no rales.   Abdominal: Abdomen is soft. Bowel sounds are normal. She exhibits distension. There is no abdominal tenderness. There is no rebound and no guarding.   Musculoskeletal:         General: No tenderness or edema. Normal range of motion.      Cervical back: Normal range of motion and neck supple.     Neurological: No cranial nerve deficit.   No purposeful movement.  Spontaneous breathing   Skin: Skin is warm and dry. No rash noted.    Psychiatric: She has a normal mood and affect. Her behavior is normal.       ED Course   Critical Care    Date/Time: 9/2/2022 6:01 AM  Performed by: Yunier Barker MD  Authorized by: Yunier Barker MD   Direct patient critical care time: 30 minutes  Total critical care time (exclusive of procedural time) : 30 minutes  Critical care was necessary to treat or prevent imminent or life-threatening deterioration of the following conditions: cardiac failure.      Labs Reviewed   CBC W/ AUTO DIFFERENTIAL - Abnormal; Notable for the following components:       Result Value    WBC 13.15 (*)     Mono % 1.0 (*)     Platelet Estimate Clumped (*)     All other components within normal limits   COMPREHENSIVE METABOLIC PANEL - Abnormal; Notable for the following components:    CO2 19 (*)     Glucose 243 (*)     Calcium 8.1 (*)     Total Protein 5.9 (*)     Albumin 2.9 (*)      (*)      (*)     All other components within normal limits   LACTIC ACID, PLASMA - Abnormal; Notable for the following components:    Lactate (Lactic Acid) 3.3 (*)     All other components within normal limits   URINALYSIS, REFLEX TO URINE CULTURE - Abnormal; Notable for the following components:    Glucose, UA 4+ (*)     All other components within normal limits    Narrative:     Specimen Source->Urine   PHOSPHORUS - Abnormal; Notable for the following components:    Phosphorus 5.9 (*)     All other components within normal limits   PROTIME-INR - Abnormal; Notable for the following components:    Prothrombin Time 17.6 (*)     INR 1.7 (*)     All other components within normal limits   TROPONIN I - Abnormal; Notable for the following components:    Troponin I 0.132 (*)     All other components within normal limits   DRUG SCREEN PANEL, URINE EMERGENCY - Abnormal; Notable for the following components:    Benzodiazepines Presumptive Positive (*)     Opiate Scrn, Ur Presumptive Positive (*)     All other components within normal  limits    Narrative:     Specimen Source->Urine   ISTAT PROCEDURE - Abnormal; Notable for the following components:    POC PH 7.186 (*)     POC PCO2 62.9 (*)     POC PO2 34 (*)     POC HCO3 23.8 (*)     POC SATURATED O2 50 (*)     All other components within normal limits   ISTAT LACTATE - Abnormal; Notable for the following components:    POC Lactate 6.52 (*)     All other components within normal limits   ISTAT PROCEDURE - Abnormal; Notable for the following components:    POC PH 7.335 (*)     POC PO2 57 (*)     POC HCO3 19.8 (*)     POC SATURATED O2 88 (*)     POC TCO2 21 (*)     All other components within normal limits   CULTURE, BLOOD   CULTURE, BLOOD   MAGNESIUM   PROCALCITONIN   B-TYPE NATRIURETIC PEPTIDE   DRUG SCREEN PANEL, URINE EMERGENCY   URINALYSIS MICROSCOPIC    Narrative:     Specimen Source->Urine   TROPONIN I   SARS-COV-2 RDRP GENE   POCT GLUCOSE MONITORING CONTINUOUS     EKG Readings: (Independently Interpreted)   Normal sinus rhythm with a rate of 92 beats per minute, a rightward axis, left bundle branch block, no obvious acute ST segment changes.  No old EKG in [facility] to compare however her documentation in from EKG performed on 08/31 patient was noted to had a left bundle-branch block.     Imaging Results              X-Ray Chest 1 View (Final result)  Result time 09/02/22 09:09:42      Final result by Andres Gilliam III, MD (09/02/22 09:09:42)                   Impression:      Pulmonary edema pneumonia aspiration or sepsis.      Electronically signed by: Andres Gilliam MD  Date:    09/02/2022  Time:    09:09               Narrative:    EXAMINATION:  XR CHEST 1 VIEW    CLINICAL HISTORY:  Encounter for adjustment and management of vascular access device    FINDINGS:  Chest one view: Tubes and lines are appropriate.  There is a pacer.  There is cardiomegaly.  There is moderate-severe edema and no change.                                       X-Ray Chest AP Portable (Final result)  Result time  "09/02/22 08:40:11      Final result by Collins Cullen MD (09/02/22 08:40:11)                   Impression:      Worsening diffuse bilateral ground-glass and airspace opacities when compared with recent prior study.    Similar positioning of support devices.      Electronically signed by: Collins Cullen MD  Date:    09/02/2022  Time:    08:40               Narrative:    EXAMINATION:  XR CHEST AP PORTABLE    CLINICAL HISTORY:  Provided history is "  Presence of other specified devices".    TECHNIQUE:  One view of the chest.    COMPARISON:  09/02/2022 at 06:51.    FINDINGS:  Cardiac wires and other support devices overlie the chest.  Endotracheal tube terminates approximately 3 cm above the gretel.  Enteric tube overlies the stomach.  Additional support devices are stable.  Cardiac silhouette is enlarged and similar to the prior study.  There are worsening diffuse bilateral ground-glass and airspace opacities throughout both lungs.  No large pleural effusion.  No distinct pneumothorax.                                       CT Head Without Contrast (Final result)  Result time 09/02/22 07:48:58      Final result by Collins Cullen MD (09/02/22 07:48:58)                   Impression:      No CT evidence of acute intracranial abnormality.  If the patient has an acute, focal neurological deficit, MRI of the brain may be indicated.      Electronically signed by: Collins Cullen MD  Date:    09/02/2022  Time:    07:48               Narrative:    EXAMINATION:  CT HEAD WITHOUT CONTRAST    CLINICAL HISTORY:  Neuro deficit, acute, stroke suspected;    TECHNIQUE:  Low dose axial images were obtained through the head.  Coronal and sagittal reformations were also performed. Contrast was not administered.    COMPARISON:  01/29/2021.    FINDINGS:  No evidence of acute territorial infarct, hemorrhage, mass effect, or midline shift.    Minimal patchy periventricular white matter hypoattenuation suggestive of chronic " microvascular ischemic change.  Ventricles are stable in size and configuration when compared with the prior study.    No displaced calvarial fracture.    Visualized paranasal sinuses and mastoid air cells are essentially clear.                                       CT Cervical Spine Without Contrast (Final result)  Result time 09/02/22 07:53:41      Final result by Collins Cullen MD (09/02/22 07:53:41)                   Impression:      No acute cervical fracture.    Biapical ground-glass opacities and interlobular septal thickening suggestive of pulmonary edema or infectious/inflammatory process.      Electronically signed by: Collins Cullen MD  Date:    09/02/2022  Time:    07:53               Narrative:    EXAMINATION:  CT CERVICAL SPINE WITHOUT CONTRAST    CLINICAL HISTORY:  Neck trauma, intoxicated or obtunded (Age >= 16y);    TECHNIQUE:  Low dose axial images, sagittal and coronal reformations were performed though the cervical spine.  Contrast was not administered.    COMPARISON:  None.    FINDINGS:    Normal curvature and alignment.  Vertebral body heights are well maintained.  No advanced degenerative changes.  No significant central canal stenosis or neural foraminal narrowing.  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  No acute fracture identified.  Prevertebral soft tissues are normal.  Lung apices demonstrate patchy ground-glass opacities and interlobular septal thickening.  Endotracheal tube is present with the tip terminating below the field of view.  There are retained secretions in the trachea.  Enteric tube is also present with the tip extending below the field of view.                                       X-Ray Abdomen AP 1 View (KUB) (Final result)  Result time 09/02/22 07:27:13      Final result by Collins Cullen MD (09/02/22 07:27:13)                   Impression:      Enteric tube overlies the distal stomach.      Electronically signed by: Collins Cullen  "MD  Date:    09/02/2022  Time:    07:27               Narrative:    EXAMINATION:  XR ABDOMEN AP 1 VIEW    CLINICAL HISTORY:  Encounter for fitting and adjustment of other gastrointestinal appliance and device    TECHNIQUE:  Single AP View of the abdomen was performed.    COMPARISON:  Chest radiograph, same day.    FINDINGS:  Enteric tube overlies the distal stomach.  Mild gaseous distention of bowel in the partially visualized upper abdomen.  Lower chest is similar to recent chest radiograph.                                       X-Ray Chest AP Portable (Final result)  Result time 09/02/22 07:24:05      Final result by Collins Cullen MD (09/02/22 07:24:05)                   Impression:      Endotracheal tube terminates 2 cm above the gretel.    Bilateral ground-glass opacities suggestive of pulmonary edema versus pneumonia or aspiration in the setting of sepsis.      Electronically signed by: Collins Cullen MD  Date:    09/02/2022  Time:    07:24               Narrative:    EXAMINATION:  XR CHEST AP PORTABLE    CLINICAL HISTORY:  Provided history is "Sepsis;  ".    TECHNIQUE:  One view of the chest.    COMPARISON:  02/12/2018.    FINDINGS:  Cardiac wires and other support devices including defibrillator pads overlie the chest.  Endotracheal tube terminates 2 cm above the gretel.  Left chest wall AICD is present with transvenous lead overlying the heart.  Enteric tube overlies the distal stomach.  Cardiac silhouette is enlarged.  There are patchy bilateral ground-glass opacities, most pronounced in the perihilar region.  Left costophrenic angle is obscured.  Mild gaseous distention of bowel in the upper abdomen with moderate volume stool burden in the partially visualized colon.                                       Medications   NORepinephrine 4 mg in dextrose 5% 250 mL infusion (premix) (titrating) (1 mcg/kg/min × 88.5 kg Intravenous New Bag 9/2/22 0856)   amiodarone 360 mg/200 mL (1.8 mg/mL) infusion (1 " mg/min Intravenous New Bag 9/2/22 0758)   amiodarone 360 mg/200 mL (1.8 mg/mL) infusion (has no administration in time range)   lactated ringers bolus 1,000 mL (1,000 mLs Intravenous New Bag 9/2/22 8957)   vancomycin (VANCOCIN) 2,000 mg in dextrose 5 % 500 mL IVPB (2,000 mg Intravenous New Bag 9/2/22 0907)   dextrose 50% injection 12.5 g (has no administration in time range)   glucagon (human recombinant) injection 1 mg (has no administration in time range)   dextrose 10% bolus 125 mL (has no administration in time range)   dextrose 10% bolus 250 mL (has no administration in time range)   insulin aspart U-100 pen 0-5 Units (has no administration in time range)   piperacillin-tazobactam 4.5 g in dextrose 5 % 100 mL IVPB (ready to mix system) (has no administration in time range)   0.9%  NaCl infusion (has no administration in time range)   sodium chloride 0.9% flush 10 mL (has no administration in time range)     And   sodium chloride 0.9% flush 10 mL (has no administration in time range)   amiodarone in dextrose 150 mg/100 mL (1.5 mg/mL) loading dose 150 mg (0 mg Intravenous Stopped 9/2/22 0752)     Medical Decision Making:   Initial Assessment:   62-year-old female presenting with cardiac arrest s/p ROSC.  Fingerstick in the field 135.  On arrival patient has fixed pupils but spontaneous respirations.  Intubated in the field, bilateral breath sounds noted on exam.  EKG shows wide QRS.  Extended round of ACLS performed by EMS including for socks, 5 rounds of epi, 1 amp of bicarb in 300 of amiodarone.  Patient arrives with strong palpable pulse, regular rate.  No purposeful movement.  Patient being signed out to Dr. Carbone.         Scribe Attestation:   Scribe #1: I performed the above scribed service and the documentation accurately describes the services I performed. I attest to the accuracy of the note.               [place] of care at 6:10 am:  This is a 62 y.o female, with a medical history of  Hypertension, CHF, Diabetes, Hyperlipidemia, Obesity and Cardiomyopathy, who presents to the ED via EMS transportation for cardiac arrest. Per EMS, pt's  noted that he initially found pt to be short of breath, however, she became apneic shortly after prompting EMS to be called. EMS states that pt was pulseless upon their arrival and was found to have an initial rhythm of V-tach. Pt was defibrillated 3 times and subsequently went into PEA rhthym. EMS reports that she was given 300 Amiodarone as well as sodium bicarbonate and 6 epi's in total. EMS notes that pt then went into sinus rhythm and 12 lead EKG indicated a STEMI. Pt's initial blood pressure after 2 minutes was 72/50. Levo was hung, however, pt went back into V-tach and was defibrillated again. She then went into junctional rhythm and CPR was started. Pt was given the 6th epi and went into sinus rhythm. EMS notes that Levo was then restarted.     Per pt's , he awoke at 3:00 am this morning to find pt unresponsive. He states that pt went to bed around 1:00 am this morning and notes that she was fine at that time. He denies any shortness of breath at bed time.  notes that pt's has been experiencing issues with her defibrillator lately. He notes pt's cardiologist as Dr. Moreno.    Of note, pt follows with EP at [facility] and is followed by cardiology at [facility]. She was recently seen by EP on 8/31/22, for a follow up visit. During that visit she reported that she was doing well, but had a shock from the device. The device was subsequently interrogated and per note from EP, it was possible that pt had an inappropriate shock for an SVT. An EKG was preformed and she was shown to have a widened QRS that would require CRT_D upgrade. Per EP note, there was a plan for a study for a possible ablation for next week. Additionally of note, pt has a cardiac defibrillator which was placed in 2013 and is a medtronic device.     Physical  Exam:  The pupils are fixed. There is no purposeful movement. Breath sounds are clear and equal bilaterally. No external signs of trauma. C-collar is in place. She is intubated. She is obese. Henderson catheter is in place.      MDM  This is an emergent evaluation of a 62 y.o. female, with a medical history of Hypertension, CHF (EF 15%), Diabetes, Hyperlipidemia, Obesity and Cardiomyopathy, who presents to the ED via EMS transportation for cardiac arrest. Initial vitals in the ED with a temperature of 96.7 F rectally, blood pressure 67/47, RR 12 Physical exam noted above. DDx includes but is not limited to sustain Vtach vs other arrhythmia, ACS, stroke, occult intracranial trauma, sepsis. Also considered but clinically less likely to be PE, dissection. Labs and imaging including a cardiac work-up, procalcitonin, coags, Mag, phosphorus, UDS, lactic, blood cultures, VBG, point of care COVID, chest x-ray, CT head and cervical spine without contrast was obtained.      Génesis Carbone D.O  EMERGENCY MEDICINE   09/02/2022      6:35 am--- Consulted cardiology. Spoke with Dr. Arreola, who is aware of the patient.  He review EKG from EMS and initial EKG obtained, and states that there is no indication for cath lab at this time.Patient was started on norepinephrine peripherally. Order placed for PICC line and PICC team was contacted.      Labs reveal elevated troponin at 0.132, elevated phosphorus, lactic 3.3. CMP reveals a bicarb 19, glucose 243 with , ALT of 510 which is increased when compared to most recent labs from June 2015.  CBC with a mild leukocytosis.  UDS reveals benzodiazepines as well as opiates, which are likely her home medications.  VBG shows acidosis with a pH of 7.1, pCO2 with 62.9.  Point of care lactic 6.5.  Chest x-ray shows bilateral ground-glass opacities concerning for pulmonary edema versus pneumonia versus aspiration.  Abdominal x-ray was also obtained after OG placement.  OG tube appears to  be a distal stomach.      6:47 am-- Spoke with Paty with [employer], who states that the device interrogated and revealed that patient had several episodes of V-tach starting at 4:30 am to 5:30 am. She was shocked twice, once at 20 joules and again at 35 joules. Both defibrillators converted the patient.     I also discussed with Dr. Arreola these findings.  Patient started on amiodarone drip.    UPDATE  CT head showed no acute intracranial abnormality.  CT cervical spine showed no acute fracture or dislocation.  There did appear to be biapical ground-glass opacities with interlobular septal thickening concerning for edema versus an infectious process.  Given elevated white count as well as lactic acid, patient was covered with broad-spectrum antibiotics.  She is also ordered IV fluids.    Génesis Carbone D.O  EMERGENCY MEDICINE   8:11 AM 09/02/2022     This chart was completed using dictation software, as a result there may be some transcription errors       8:25 am-- Spoke to Dr. Bereket Darden who came down and evaluated the patient.  Patient was admitted to the ICU.  However, noticed patients pulse ox was in the 80's. ABG being attained currently by respiratory. Per Dr. Darden, patient is ok to be transported to ICU when a bed is available. States he will consult pulmonology.  ABG showed a PO2 of 57.  Acidosis improved. PEEP increased from 5-8.  Pulse ox improved.  PICC team also present in the ED to place PICC line.              Clinical Impression:   Final diagnoses:  [I46.9] Cardiac arrest  [Z46.59] Encounter for orogastric (OG) tube placement  [Z97.8] Endotracheal tube present      ED Disposition Condition    Admit                I, Génesis Carbone DO, personally performed the services described in this documentation. All medical record entries made by the scribe were at my direction and in my presence. I have reviewed the chart and agree that the record reflects my personal  performance and is accurate and complete.      Génesis Carbone, DO  09/02/22 0928

## 2022-09-02 NOTE — ASSESSMENT & PLAN NOTE
EMS reports the patient's  then watched her collapse.  And immediately started CPR.  Per EMS, it is estimated that was approximately 2-3 minutes between collapse and start of CPR.  They report EMS was immediately on scene.  They estimate approximately 20 minutes between initiation of CPR and Rosc.  Prior to Rosc, the patient received 5 epis, 1 amp of bicarb, 300 of amiodarone.  They report that she was in pulseless V-tach or VFib and shocked her 4 times.  They report a junctional rhythm after the 3rd shock.[employer] was called,, who states that the device interrogated and revealed that patient had several episodes of V-tach starting at 4:30 am to 5:30 am. She was shocked twice, once at 20 joules and again at 35 joules. Both defibrillators converted the patient.now per ER family say patient may was longer time unresponsive,patient in ER is intubated,head CT show no acute process,chest X ray show ground glass opacities,patient has been started on IV Abx,she has no gag reflex,fixed pupillae,not responding,cardiology is consulted and patient has been  started on amiodarone gtt and levophed for shock.not able do ROS duo to condition of patient.consulted cardiology and palliative care,check Echo,trend Troponin.

## 2022-09-02 NOTE — ASSESSMENT & PLAN NOTE
Patient's FSGs are controlled on current medication regimen.  Last A1c reviewed-   Lab Results   Component Value Date    HGBA1C 6.9 (H) 10/18/2021     Most recent fingerstick glucose reviewed- No results for input(s): POCTGLUCOSE in the last 24 hours.  Current correctional scale  Medium  Maintain anti-hyperglycemic dose as follows-   Antihyperglycemics (From admission, onward)    Start     Stop Route Frequency Ordered    09/02/22 0919  insulin aspart U-100 pen 0-5 Units         -- SubQ Every 6 hours PRN 09/02/22 0819        Hold Oral hypoglycemics while patient is in the hospital.  On SSI.

## 2022-09-02 NOTE — ASSESSMENT & PLAN NOTE
-Patient down for unknown amount of time; CPR initiated by spouse, EMS arrived Prior to Rosc, the patient received 5 epis, 1 amp of bicarb, 300 of amiodarone. They report that she was in pulseless V-tach or VFib and shocked her 4 times.  They report a junctional rhythm after the 3rd shock.[employer] was called,, who states that the device interrogated and revealed that patient had several episodes of V-tach starting at 4:30 am to 5:30 am. She was shocked twice, once at 20 joules and again at 35 joules.  -CARDS consulted

## 2022-09-02 NOTE — ASSESSMENT & PLAN NOTE
Med rx on hold  Will resume pending clinical course  Known CMP, hx peripartum CMP (?prior isch eval)  MDT ICD in place

## 2022-09-02 NOTE — H&P
[facility]  [facility] Medicine  History & Physical    Patient Name: Marlena DODGE  MRN: [id number]  Patient Class: IP- Inpatient  Admission Date: 9/2/2022  Attending Physician: Bereket Darden    Primary Care Provider: Skyler Garcia MD         Patient information was obtained from spouse/SO, relative(s), EMS personnel, past medical records and ER records.     Subjective:     Principal Problem:Cardiac arrest    Chief Complaint:   Chief Complaint   Patient presents with    Cardiac Arrest     Pt arrived via EMS. Pt found unresponsive by spouse with CPR initiated at 2-3 minutes after witnessed collapse. Per EMS, pt was found in persistent VF and pVT with defibrillation x4; ROSC obtained @ 3rd shock; EMS administered Epi x5, 100 mEq Sodium Bicarb, and 300mg Amiodarone.        HPI: 62 y.o. female, with a medical history of Hypertension, CHF (EF 15%), Diabetes, Hyperlipidemia,DM, Obesity and Cardiomyopathy, who presents to the ED via EMS transportation for cardiac arrest. Initial vitals in the ED with a temperature of 96.7 F rectally, blood pressure 67/47, RR 12,Per EMS,  reported she had sudden onset respiratory distress.  EMS reports the patient's  then watched her collapse.  And immediately started CPR.  Per EMS, it is estimated that was approximately 2-3 minutes between collapse and start of CPR.  They report EMS was immediately on scene.  They estimate approximately 20 minutes between initiation of CPR and Rosc.  Prior to Rosc, the patient received 5 epis, 1 amp of bicarb, 300 of amiodarone.  They report that she was in pulseless V-tach or VFib and shocked her 4 times.  They report a junctional rhythm after the 3rd shock.[employer] was called,, who states that the device interrogated and revealed that patient had several episodes of V-tach starting at 4:30 am to 5:30 am. She was shocked twice, once at 20 joules and again at 35 joules. Both defibrillators converted the  patient.now per ER family say patient may was longer time unresponsive,patient in ER is intubated,head CT show no acute process,chest X ray show ground glass opacities,patient has been started on IV Abx,she has no gag reflex,fixed pupillae,not responding,cardiology is consulted and patient has been  started on amiodarone gtt and levophed for shock.not able do ROS duo to condition of patient.      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%    Diastolic dysfunction with heart failure 2015    Echo     HTN (hypertension)     Hx of psychiatric care     Hyperglycemia     Hyperlipidemia     ICD (implantable cardioverter-defibrillator), dual, in situ     [employer]     LAE (left atrial enlargement) 2015    MR (mitral regurgitation) 2015    mod    Panic disorder     Psychiatric problem     Therapy        Past Surgical History:   Procedure Laterality Date    CARDIAC DEFIBRILLATOR PLACEMENT       SECTION         Review of patient's allergies indicates:   Allergen Reactions    Aspirin Nausea And Vomiting    Codeine Nausea And Vomiting       No current facility-administered medications on file prior to encounter.     Current Outpatient Medications on File Prior to Encounter   Medication Sig    ALPRAZolam (XANAX) 1 MG tablet Take 1 tablet (1 mg total) by mouth daily as needed for Anxiety. May take 1/2 pill as needed as well    amitriptyline (ELAVIL) 100 MG tablet Take 3 tablets (300 mg total) by mouth every evening.    atorvastatin (LIPITOR) 20 MG tablet Take 20 mg by mouth once daily.    carvedilol (COREG) 6.25 MG tablet TAKE 2 TABLET BY MOUTH EVERY MORNING AND EVERY EVENING. TAKE MORNING DOSE RIGHT BEFORE LISINOPRIL    cholecalciferol, vitamin D3, (VITAMIN D3) 4,000 unit Cap Take 4,000 mg by mouth once daily.     famotidine (PEPCID) 20 MG tablet Take 1 tablet (20 mg total) by mouth 2 (two) times daily.    furosemide (LASIX) 40 MG tablet Take 1 tablet (40 mg total) by mouth once daily.    hydrocodone-acetaminophen 5-325mg (NORCO) 5-325 mg per tablet Take 1 tablet by mouth every 8 (eight) hours as needed for Pain.    latanoprost 0.005 % ophthalmic solution     lisinopril (PRINIVIL,ZESTRIL) 40 MG tablet Take 1 tablet (40 mg total) by mouth once daily.    mecobal/levomefolat Ca/B6 phos (METANX ORAL) Take by mouth.    metformin (GLUCOPHAGE) 500 MG tablet TAKE 2 TABLETS BY MOUTH TWICE DAILY    omega-3 acid ethyl esters (LOVAZA) 1 gram capsule TAKE 1 CAPSULE BY MOUTH TWICE DAILY    ondansetron (ZOFRAN) 4 MG tablet Take 1 tablet (4 mg total) by mouth every 6 (six) hours as needed.    perphenazine-amitriptyline 4-50 mg Tab Take 2 tablets by mouth at bedtime    potassium chloride (KLOR-CON) 10 MEQ TbSR Take 2 tablets (20 mEq total) by mouth once daily.    pravastatin (PRAVACHOL) 40 MG tablet Take 1 tablet (40 mg total) by mouth once daily.    SACUBITRIL/VALSARTAN (ENTRESTO ORAL) Take by mouth.     Family History       Problem Relation (Age of Onset)    Cancer Maternal Aunt, Paternal Aunt    Depression Sister    Early death Mother    Hypertension Mother, Father    Stroke Father          Tobacco Use    Smoking status: Never    Smokeless tobacco: Never   Substance and Sexual Activity    Alcohol use: No    Drug use: No    Sexual activity: Not on file     Review of Systems,not able be  done.  Objective:     Vital Signs (Most Recent):  Temp: (!) 95.5 °F (35.3 °C) (09/02/22 0738)  Pulse: 105 (09/02/22 0738)  Resp: (!) 0 (09/02/22 0732)  BP: (!) 148/86 (09/02/22 0733)  SpO2: (!) 90 % (09/02/22 0738)   Vital Signs (24h Range):  Temp:  [94.3 °F (34.6 °C)-96.7 °F (35.9 °C)] 95.5 °F (35.3 °C)  Pulse:  [] 105  Resp:  [0-20] 0  SpO2:  [90 %-100 %] 90 %  BP: ()/(47-97) 148/86     Weight: 88.5 kg (195 lb)  Body mass index  is 33.47 kg/m².    Physical Exam  Constitutional:       Appearance: She is ill-appearing.   HENT:      Head: Atraumatic.      Nose: Nose normal.   Eyes:      Extraocular Movements: Extraocular movements intact.   Cardiovascular:      Rate and Rhythm: Normal rate and regular rhythm.   Pulmonary:      Effort: Respiratory distress present.      Breath sounds: Normal breath sounds.   Abdominal:      General: There is no distension.      Palpations: Abdomen is soft.      Tenderness: There is no abdominal tenderness.   Musculoskeletal:         General: No swelling or deformity. Normal range of motion.      Cervical back: Normal range of motion.   Skin:     General: Skin is warm and dry.   Neurological:      Comments: Not responsive fixed pupils,           Significant Labs: All pertinent labs within the past 24 hours have been reviewed.  ABGs:   Recent Labs   Lab 09/02/22 0631 09/02/22 0824   PH 7.186* 7.335*   PCO2 62.9* 37.1   HCO3 23.8* 19.8*   POCSATURATED 50* 88*   BE -6 -5   PO2 34* 57*     BMP:   Recent Labs   Lab 09/02/22 0618   *      K 4.2      CO2 19*   BUN 15   CREATININE 1.0   CALCIUM 8.1*   MG 2.0     CBC:   Recent Labs   Lab 09/02/22 0618   WBC 13.15*   HGB 12.3   HCT 38.0   PLT SEE COMMENT     CMP:   Recent Labs   Lab 09/02/22 0618      K 4.2      CO2 19*   *   BUN 15   CREATININE 1.0   CALCIUM 8.1*   PROT 5.9*   ALBUMIN 2.9*   BILITOT 0.3   ALKPHOS 128   *   *   ANIONGAP 13     Troponin:   Recent Labs   Lab 09/02/22 0618   TROPONINI 0.132*       Significant Imaging: I have reviewed all pertinent imaging results/findings within the past 24 hours.    Assessment/Plan:     * Cardiac arrest  EMS reports the patient's  then watched her collapse.  And immediately started CPR.  Per EMS, it is estimated that was approximately 2-3 minutes between collapse and start of CPR.  They report EMS was immediately on scene.  They estimate approximately 20  minutes between initiation of CPR and Rosc.  Prior to Rosc, the patient received 5 epis, 1 amp of bicarb, 300 of amiodarone.  They report that she was in pulseless V-tach or VFib and shocked her 4 times.  They report a junctional rhythm after the 3rd shock.[employer] was called,, who states that the device interrogated and revealed that patient had several episodes of V-tach starting at 4:30 am to 5:30 am. She was shocked twice, once at 20 joules and again at 35 joules. Both defibrillators converted the patient.now per ER family say patient may was longer time unresponsive,patient in ER is intubated,head CT show no acute process,chest X ray show ground glass opacities,patient has been started on IV Abx,she has no gag reflex,fixed pupillae,not responding,cardiology is consulted and patient has been  started on amiodarone gtt and levophed for shock.not able do ROS duo to condition of patient.consulted cardiology and palliative care,check Echo,trend Troponin.      Shock circulatory  Started on levophed,empitic IV Abx,      Airway compromise  S/P intubation,pulmonology is consulted.      Type 2 diabetes mellitus  Patient's FSGs are controlled on current medication regimen.  Last A1c reviewed-   Lab Results   Component Value Date    HGBA1C 6.9 (H) 10/18/2021     Most recent fingerstick glucose reviewed- No results for input(s): POCTGLUCOSE in the last 24 hours.  Current correctional scale  Medium  Maintain anti-hyperglycemic dose as follows-   Antihyperglycemics (From admission, onward)    Start     Stop Route Frequency Ordered    09/02/22 0919  insulin aspart U-100 pen 0-5 Units         -- SubQ Every 6 hours PRN 09/02/22 0819        Hold Oral hypoglycemics while patient is in the hospital.  On SSI.    Cardiomyopathy  Was followed in [place] and [facility],repeat echo.      Chronic combined systolic and diastolic congestive heart failure  With EF of 10-15%,alreday has AICD.      Anxiety and depression  Per record.      MR (mitral  regurgitation)  Per echo.        LAE (left atrial enlargement)  Per echo.      Hyperlipidemia  Was on satin.      HTN (hypertension)  BP med' son hold duo to hypotension.      ICD (implantable cardioverter-defibrillator), dual, in situ  Monitor on Tele.        VTE Risk Mitigation (From admission, onward)    Anup Darden MD  Department of Hospital Medicine   [facility] - Emergency Dept

## 2022-09-02 NOTE — HOSPITAL COURSE
62 y.o. female, with a medical history of Hypertension, CHF (EF 15%), Diabetes, Hyperlipidemia,DM, Obesity and Cardiomyopathy, who presents to the ED via EMS transportation for cardiac arrest. Initial vitals in the ED with a temperature of 96.7 F rectally, blood pressure 67/47, RR 12,Per EMS,  reported she had sudden onset respiratory distress.  EMS reports the patient's  then watched her collapse.  And immediately started CPR.  Per EMS, it is estimated that was approximately 2-3 minutes between collapse and start of CPR.  They report EMS was immediately on scene.  They estimate approximately 20 minutes between initiation of CPR and Rosc.  Prior to Rosc, the patient received 5 epis, 1 amp of bicarb, 300 of amiodarone.  They report that she was in pulseless V-tach or VFib and shocked her 4 times.  They report a junctional rhythm after the 3rd shock.[employer] was called,, who states that the device interrogated and revealed that patient had several episodes of V-tach starting at 4:30 am to 5:30 am. She was shocked twice, once at 20 joules and again at 35 joules. Both defibrillators converted the patient.now per ER family say patient may was longer time unresponsive,patient in ER is intubated,head CT show no acute process,chest X ray show ground glass opacities,patient has been started on IV Abx,she has no gag reflex,fixed pupillae,not responding,cardiology was consulted and patient has been  started on amiodarone gtt and levophed for shock.not able do ROS duo to condition of patient.palliative care was consulted duo to very poor prognosis,unfortunately shortly after patient was admitted to ICU,patient had another cardiac arrest,patient was started  on ACLS,was shocked multiple times,ROSC was not achieved,patient was pronounced death at 14:09.family was notified.

## 2022-09-02 NOTE — ASSESSMENT & PLAN NOTE
- Patient status post cardiac arrest with multiple shocks; ROSC achieved by EMS. Patient intubaPatient linda in the field  - CXR confirmed placement of ET tube x2  - currently on ventilator; current settings VC 22/15/380/100%  - Arterial line placed; concerning arterial blood gas 7.3/35/42/18 for continued hypoxia  - Plan for repeat CT head Saturday morning  - Continued hypoxia despite good ventilation; some concern for possible PE vs AV shunt  - Echo and CTA ordered for further evaluation  - Continue to have target body temperature of 36C  - Limit fluids 2/2 minimal EF

## 2022-09-02 NOTE — CARE UPDATE
To CT Scan at 1350 via bed, on cardiac monitor and portable ventilator, accompanied by 2 ICU RNs(Marga Wyatt RN/Alena Arita RN) and respiratory therapist(Austin Dowell)    Patient in sinus rhythm/macho on arrival to ct scan, head of bed lowered to transfer to ct table and patient rhythm becomes agonal, no pulse palpated.   Code one called, CPR started at 1355 with ambu ventilations, code cart being obtained.  See code charting.  Dr Bonilla/Dr Darden/Dr Hernandez and JULIANNE BLOOD arrive shortly after code called.  RNs--izzy cardoso/kinga hernández/nicholas sinha/milena acosta rn and luis eduardo RIVAS at code.   Code stopped at 1409 by dr bonilla and patient pronouced by dr bonilla.   Transported back to ICU, viewed by family.   To rachel after postmortem care done.at 1740

## 2022-09-02 NOTE — ASSESSMENT & PLAN NOTE
VT arrest with ROSC by EMS  Known CMP, hx peripartum CMP (?prior isch eval)  CAHP score determined by duration of arrest prior to CPR  Cont amio and vasopressor support  Check echo  Further testing pending clinical course  MDT ICD in place

## 2022-09-02 NOTE — ED NOTES
Report given to MAGDY Gresham. Pt sent up with COREY, Vanco, amio, and Soledad reyes. Healthcare staff notified pt is hypothermic, (RNs and MDs) and will require Darin hugger.

## 2022-09-05 LAB
BACTERIA BLD CULT: ABNORMAL

## 2022-09-06 LAB — BACTERIA BLD CULT: NORMAL

## 2022-09-12 NOTE — PHYSICIAN QUERY
PT Name: Marlena DODGE  MR #: [id number]     DOCUMENTATION CLARIFICATION      CDS/: LEANNA Hidalgo, RN, CCDS               Contact information: [email or web address]    This form is a permanent document in the medical record.     Query Date: September 12, 2022    Dear Provider,  By submitting this query, we are merely seeking further clarification of documentation.  Please utilize your independent clinical judgment when addressing the question(s) below.     The Medical Record contains the following:    Supporting Clinical Findings Location in Medical Record   Per EMS,  reported she had sudden onset respiratory distress  presenting with witnessed cardiac arrest    She has no wheezes. She has no rhonchi. She has no rales    vitals in the ED with a temperature of 96.7 F rectally, blood pressure 67/47, RR 12      Chest x-ray shows bilateral ground-glass opacities concerning for pulmonary edema versus pneumonia versus aspiration.     There did appear to be biapical ground-glass opacities with interlobular septal thickening concerning for edema versus an infectious process.  Given elevated white count as well as lactic acid, patient was covered with broad-spectrum antibiotics.  She is also ordered IV fluids.    ED Note: Dr. Carbone 9/2     WBC: 13.15   Lab: 9/2   chest X ray show ground glass opacities,patient has been started on IV Abx,she has no gag reflex,fixed pupillae,not responding   H & P: Dr. Darden 9/2   piperacillin-tazobactam 4.5 g in dextrose 5 % 100 mL IVPB (ready to mix system)  Dose: 4.5 g  Freq: Every 8 hours (non-standard times) Route: IV  Indications of Use: upper respiratory infection  Start: 09/02/22 0930 End: 09/02/22 2001 MAR     Please clarify if the __ __pneumonia_______________________ diagnosis has been:    [  x] Ruled In   [  ] Ruled In, Now Resolved   [  ] Ruled Out   [  ] Still to be ruled out at the time of discharge   [  ] Other/Clarification of findings  (please specify): _______________    [   ] Clinically undetermined     Please document in your progress notes daily for the duration of treatment, until resolved, and include in your discharge summary.    Form No. [id number]

## 2022-09-12 NOTE — PHYSICIAN QUERY
PT Name: Marlena DODGE  MR #: [id number]     DOCUMENTATION CLARIFICATION     CDS/: LEANNA Hidalgo, RN, CCDS               Contact information: [email or web address]  This form is a permanent document in the medical record.     Query Date: 2022    By submitting this query, we are merely seeking further clarification of documentation.  Please utilize your independent clinical judgment when addressing the question(s) below.  The Medical Record contains the following   Indicators   Supporting Clinical Findings Location in Medical Record   X SOB, BLAKELY, Wheezing, Productive Cough, Use of Accessory Muscles, etc. Per EMS, pt was found agonally breathing ED note: FLAVIO Godinez RN     RR         ABGs         O2 sat PO2: 34    VBG shows acidosis with a pH of 7.1, pCO2 with 62.9    RR: 31; SpO2: 75%   AB/2    ED: Dr. Carbone       Pulm Consult: Dr. Ferreira    X Hypoxia/Hypercapnia has remained hypoxic despite maximal ventilator support    Pulm Consult: Dr. Ferreira    X BiPAP/Intubation/Mechanical Ventilation Intubated in the field ED: Dr. Carbone     Supplemental O2      Home O2, Oxygen Dependence     X Respiratory distress or failure   reported she had sudden onset respiratory distress    Effort: Respiratory distress present   ED: Dr. Carbone       Pulm Consult: Dr. Ferreira     Radiology findings     X Acute/Chronic Illness medical history of Hypertension, CHF (EF 15%), Diabetes, Hyperlipidemia,DM, Obesity and Cardiomyopathy, who presents to the ED via EMS transportation for cardiac arrest    Circulatory shock, Airway compromise  DCS: Dr. Darden     Treatment      Other         The noted clinical guidelines are only system guidelines and do not replace the providers clinical judgment.    Provider, please clarify respiratory diagnosis associated with above clinical findings.     [   x ] Acute Respiratory Failure with Hypoxia - ABG pO2 < 60 mmHg or O2  sat of <91% on room air and respiratory symptoms documented   [    ] Acute Respiratory Failure with Hypercapnia - pCO2 > 50 mmHg with pH < 7.35 and respiratory symptoms documented   [    ] Acute Respiratory Failure with Hypoxia and Hypercapnia - Hypoxia: ABG pO2 < 60 mmHg or O2 sat of <91% on room air and Hypercapnia: pCO2 > 50 mmHg with pH < 7.35 and respiratory symptoms documented   [    ] Acute Respiratory Failure, unspecified whether with hypoxia or hypercapnia   [    ] Hypoxia Only   [    ] Other Respiratory Diagnosis (please specify): _________________   [   ] Clinically Undetermined     Please document in your progress notes daily for the duration of treatment until resolved and include in your discharge summary.     Reference:    URI Bauer MD. (2020, March 13). Acute respiratory distress syndrome: Clinical features, diagnosis, and complications in adults ([id number] JORGE LUIS Turner MD & [id number] SALEEM Mariee MD, Eds.). Retrieved November 13, 2020, from [email or web address]=search_result&selectedTitle=1~150&usage_type=default&display_rank=1  Form No. [id number]

## 2022-09-14 NOTE — PHYSICIAN QUERY
"xPT Name: Marlena DODGE  MR #: [id number]     DOCUMENTATION CLARIFICATION     CDS/: Kale Gray               Contact information:  This form is a permanent document in the medical record.     Query Date: September 14, 2022    By submitting this query, we are merely seeking further clarification of documentation.  Please utilize your independent clinical judgment when addressing the question(s) below.  The Medical Record contains the following:  Indicators Supporting Clinical Findings Location in Medical Record   X HR         RR          BP        Temp BP: 67/47  Initial vitals in the ED with a temperature of 96.7 F rectally    HR: 105; T: 95.5      RR: 38   ED: Dr. Carbone 9/2      H & P: Dr. Darden 9/2    Card consult: Dr. Jara 9/2     X Lactic Acid          Procalcitonin Lactic Acid: 3.3 mmol/L  Procal: 0.05 ng/mL   Lab: 9/2   X WBC           Bands          CRP  WBC: 13.15 K/uL Lab: 9/2   X Culture(s) DIPHTHEROIDS   Organism is a probable contaminant    No growth after 4 days Blood culture: 9/2    AMS, Confusion, LOC, etc.      Organ Dysfunction/Failure     X Bacteremia or Sepsis / Septic EXAMINATION:  XR CHEST AP PORTABLE     CLINICAL HISTORY:  Provided history is "Sepsis;  ".    Bilateral ground-glass opacities suggestive of pulmonary edema versus pneumonia or aspiration in the setting of sepsis   CXR: 9/2   X Known or Suspected Source of Infection documented Chest x-ray shows bilateral ground-glass opacities concerning for pulmonary edema versus pneumonia versus aspiration    Pneumonia ruled in    ED: Dr. Carbone 9/2      Physician query: Dr. Darden 9/2      (Failed) Outpatient Treatment     X Medication vancomycin (VANCOCIN) 2,000 mg in dextrose 5 % 500 mL IVPB  Dose: 2,000 mg  Freq: Once Route: IV  Indications of Use: Sepsis of Unknown Source  Start: 09/02/22 0800 End: 09/02/22 1107    piperacillin-tazobactam 4.5 g in dextrose 5 % 100 mL IVPB (ready to mix " system)  Dose: 4.5 g  Freq: ED 1 Time Route: IV  Indications Comment: unknown sepsis  Start: 09/02/22 0800 End: 09/02/22 0822 MAR    Treatment      Other          Provider, please clarify sepsis associated with above clinical findings.    [ x  ] Sepsis due to unknown organism   [   ] Sepsis due to (suspected) organism (please specify): __________   [   ] Bacteremia without Sepsis   [   ] Bacteremia with Sepsis   [   ] SIRS without infectious etiology   [   ] SIRS with infection but without Sepsis   [   ] Other Infectious Disease (please specify): __________   [   ] Sepsis Ruled Out   [  ] Clinically Undetermined         Please document in your progress notes daily for the duration of treatment until resolved and include in your discharge summary.